# Patient Record
Sex: MALE | Race: WHITE
[De-identification: names, ages, dates, MRNs, and addresses within clinical notes are randomized per-mention and may not be internally consistent; named-entity substitution may affect disease eponyms.]

---

## 2020-08-04 ENCOUNTER — TRANSCRIPTION ENCOUNTER (OUTPATIENT)
Age: 45
End: 2020-08-04

## 2020-10-05 ENCOUNTER — EMERGENCY (EMERGENCY)
Facility: HOSPITAL | Age: 45
LOS: 0 days | Discharge: HOME | End: 2020-10-05
Attending: EMERGENCY MEDICINE | Admitting: EMERGENCY MEDICINE
Payer: COMMERCIAL

## 2020-10-05 VITALS
HEART RATE: 77 BPM | OXYGEN SATURATION: 99 % | TEMPERATURE: 98 F | SYSTOLIC BLOOD PRESSURE: 141 MMHG | DIASTOLIC BLOOD PRESSURE: 90 MMHG | RESPIRATION RATE: 18 BRPM

## 2020-10-05 VITALS
RESPIRATION RATE: 18 BRPM | TEMPERATURE: 98 F | HEIGHT: 68 IN | DIASTOLIC BLOOD PRESSURE: 107 MMHG | HEART RATE: 89 BPM | OXYGEN SATURATION: 99 % | SYSTOLIC BLOOD PRESSURE: 167 MMHG | WEIGHT: 184.97 LBS

## 2020-10-05 DIAGNOSIS — S20.212A CONTUSION OF LEFT FRONT WALL OF THORAX, INITIAL ENCOUNTER: ICD-10-CM

## 2020-10-05 DIAGNOSIS — M54.9 DORSALGIA, UNSPECIFIED: ICD-10-CM
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
## 2020-10-05 DIAGNOSIS — F17.200 NICOTINE DEPENDENCE, UNSPECIFIED, UNCOMPLICATED: ICD-10-CM

## 2020-10-05 DIAGNOSIS — I10 ESSENTIAL (PRIMARY) HYPERTENSION: ICD-10-CM

## 2020-10-05 DIAGNOSIS — R07.81 PLEURODYNIA: ICD-10-CM
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
## 2020-10-05 PROCEDURE — 99284 EMERGENCY DEPT VISIT MOD MDM: CPT

## 2020-10-05 PROCEDURE — 71046 X-RAY EXAM CHEST 2 VIEWS: CPT | Mod: 26

## 2020-10-05 RX ORDER — IBUPROFEN 200 MG
600 TABLET ORAL ONCE
Refills: 0 | Status: COMPLETED | OUTPATIENT
Start: 2020-10-05 | End: 2020-10-05

## 2020-10-05 RX ORDER — METHOCARBAMOL 500 MG/1
1500 TABLET, FILM COATED ORAL ONCE
Refills: 0 | Status: COMPLETED | OUTPATIENT
Start: 2020-10-05 | End: 2020-10-05

## 2020-10-05 RX ORDER — METHOCARBAMOL 500 MG/1
1 TABLET, FILM COATED ORAL
Qty: 9 | Refills: 0
Start: 2020-10-05 | End: 2020-10-07

## 2020-10-05 RX ADMIN — Medication 600 MILLIGRAM(S): at 20:02

## 2020-10-05 RX ADMIN — METHOCARBAMOL 1500 MILLIGRAM(S): 500 TABLET, FILM COATED ORAL at 20:02

## 2020-10-05 NOTE — ED PROVIDER NOTE - NSFOLLOWUPINSTRUCTIONS_ED_ALL_ED_FT
Your xray shows no signs of broken bones or injury to your lung. Your pain is related to bruising of the rib. Please take ibuprofen 600mg every 8 hours as needed for pain and follow the instructions below.     Rib Contusion    A rib contusion is a deep bruise on your rib area. Contusions are the result of a blunt trauma that causes bleeding and injury to the tissues under the skin. A rib contusion may involve bruising of the ribs and of the skin and muscles in the area. The skin over the contusion may turn blue, purple, or yellow. Minor injuries will give you a painless contusion. More severe contusions may stay painful and swollen for a few weeks.    What are the causes?  This condition is usually caused by a blow, trauma, or direct force to an area of the body. This often occurs while playing contact sports.    What are the signs or symptoms?  Symptoms of this condition include:  Swelling and redness of the injured area.  Discoloration of the injured area.  Tenderness and soreness of the injured area.  Pain with or without movement.  How is this diagnosed?  This condition may be diagnosed based on:  Your symptoms and medical history.  A physical exam.  Imaging tests—such as an X-ray, CT scan, or MRI—to determine if there were internal injuries or broken bones (fractures).  How is this treated?  This condition may be treated with:  Rest. This is often the best treatment for a rib contusion.  Icing. This reduces swelling and inflammation.  Deep-breathing exercises. These may be recommended to reduce the risk for lung collapse and pneumonia.  Medicines. Over-the-counter or prescription medicines may be given to control pain.  Injection of a numbing medicine around the nerve near your injury (nerve block).  Follow these instructions at home:    Medicines     Take over-the-counter and prescription medicines only as told by your health care provider.  Do not drive or use heavy machinery while taking prescription pain medicine.  If you are taking prescription pain medicine, take actions to prevent or treat constipation. Your health care provider may recommend that you:   Drink enough fluid to keep your urine pale yellow.   Eat foods that are high in fiber, such as fresh fruits and vegetables, whole grains, and beans.   Limit foods that are high in fat and processed sugars, such as fried or sweet foods.   Take an over-the-counter or prescription medicine for constipation.  Managing pain, stiffness, and swelling     If directed, put ice on the injured area:  Put ice in a plastic bag.  Place a towel between your skin and the bag.  Leave the ice on for 20 minutes, 2–3 times a day.  Rest the injured area. Avoid strenuous activity and any activities or movements that cause pain. Be careful during activities and avoid bumping the injured area.  Do not lift anything that is heavier than 5 lb (2.3 kg), or the limit that you are told, until your health care provider says that it is safe.  General instructions     Do not use any products that contain nicotine or tobacco, such as cigarettes and e-cigarettes. These can delay healing. If you need help quitting, ask your health care provider.  Do deep-breathing exercises as told by your health care provider.  If you were given an incentive spirometer, use it every 1–2 hours while you are awake, or as recommended by your health care provider. This device measures how well you are filling your lungs with each breath.  Keep all follow-up visits as told by your health care provider. This is important.  Contact a health care provider if you have:  Increased bruising or swelling.  Pain that is not controlled with treatment.  A fever.  Get help right away if you:  Have difficulty breathing or shortness of breath.  Develop a continual cough or you cough up thick or bloody sputum.  Feel nauseous or you vomit.  Have pain in your abdomen.      Summary  A rib contusion is a deep bruise on your rib area. Contusions are the result of a blunt trauma that causes bleeding and injury to the tissues under the skin.  The skin overlying the contusion may turn blue, purple, or yellow. Minor injuries may give you a painless contusion. More severe contusions may stay painful and swollen for a few weeks.  Rest the injured area. Avoid strenuous activity and any activities or movements that cause pain.  This information is not intended to replace advice given to you by your health care provider. Make sure you discuss any questions you have with your health care provider.

## 2020-10-05 NOTE — ED PROVIDER NOTE - NS ED ROS FT
Constitutional: (-) fever  Eyes/ENT: (-) blurry vision, (-) epistaxis  Cardiovascular: (-) chest pain, (-) syncope  Respiratory: (-) cough, (-) shortness of breath  Gastrointestinal: (-) vomiting, (-) diarrhea  Musculoskeletal: (-) neck pain, (+) L back/flank pain, (-) joint pain  Integumentary: (-) rash, (-) edema  Neurological: (-) headache, (-) altered mental status  Psychiatric: (-) hallucinations  Allergic/Immunologic: (-) pruritus
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
## 2020-10-05 NOTE — ED PROVIDER NOTE - PATIENT PORTAL LINK FT
You can access the FollowMyHealth Patient Portal offered by [facility] by registering at the following website: [email or web address]. By joining [employer]’s FollowMyHealth portal, you will also be able to view your health information using other applications (apps) compatible with our system.
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
## 2020-10-05 NOTE — ED PROVIDER NOTE - PHYSICAL EXAMINATION
Vital Signs: I have reviewed the initial vital signs.  Constitutional: well-nourished, appears stated age, no acute distress  Cardiovascular: regular rate, regular rhythm, well-perfused extremities  Respiratory: unlabored respiratory effort, clear to auscultation bilaterally  Gastrointestinal: soft, non-tender abdomen  Musculoskeletal: supple neck, no lower extremity edema. No cervical, thoracic, lumbar midline or paraspinal tenderness. Clavicles intact. No chest wall tenderness. Pelvis stable, non-tender. No ecchymosis or swelling present over L flank.   Integumentary: warm, dry, no rash  Neurologic: awake, alert, extremities’ motor and sensory functions grossly intact  Psychiatric: appropriate mood, appropriate affect

## 2021-04-04 ENCOUNTER — TRANSCRIPTION ENCOUNTER (OUTPATIENT)
Age: 46
End: 2021-04-04

## 2021-05-16 ENCOUNTER — EMERGENCY (EMERGENCY)
Facility: HOSPITAL | Age: 46
LOS: 0 days | Discharge: HOME | End: 2021-05-16
Attending: EMERGENCY MEDICINE | Admitting: EMERGENCY MEDICINE
Payer: COMMERCIAL

## 2021-05-16 VITALS
HEART RATE: 92 BPM | OXYGEN SATURATION: 100 % | DIASTOLIC BLOOD PRESSURE: 96 MMHG | RESPIRATION RATE: 18 BRPM | SYSTOLIC BLOOD PRESSURE: 162 MMHG

## 2021-05-16 VITALS
RESPIRATION RATE: 18 BRPM | WEIGHT: 182.1 LBS | TEMPERATURE: 99 F | HEART RATE: 98 BPM | SYSTOLIC BLOOD PRESSURE: 179 MMHG | DIASTOLIC BLOOD PRESSURE: 111 MMHG | OXYGEN SATURATION: 100 % | HEIGHT: 68 IN

## 2021-05-16 DIAGNOSIS — S80.212A ABRASION, LEFT KNEE, INITIAL ENCOUNTER: ICD-10-CM

## 2021-05-16 DIAGNOSIS — E78.00 PURE HYPERCHOLESTEROLEMIA, UNSPECIFIED: ICD-10-CM

## 2021-05-16 DIAGNOSIS — F17.200 NICOTINE DEPENDENCE, UNSPECIFIED, UNCOMPLICATED: ICD-10-CM
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
## 2021-05-16 DIAGNOSIS — S00.93XA CONTUSION OF UNSPECIFIED PART OF HEAD, INITIAL ENCOUNTER: ICD-10-CM

## 2021-05-16 DIAGNOSIS — Z88.0 ALLERGY STATUS TO PENICILLIN: ICD-10-CM

## 2021-05-16 DIAGNOSIS — L53.9 ERYTHEMATOUS CONDITION, UNSPECIFIED: ICD-10-CM
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
## 2021-05-16 DIAGNOSIS — S09.90XA UNSPECIFIED INJURY OF HEAD, INITIAL ENCOUNTER: ICD-10-CM

## 2021-05-16 DIAGNOSIS — I10 ESSENTIAL (PRIMARY) HYPERTENSION: ICD-10-CM

## 2021-05-16 PROCEDURE — 70450 CT HEAD/BRAIN W/O DYE: CPT | Mod: 26,MA

## 2021-05-16 PROCEDURE — 71101 X-RAY EXAM UNILAT RIBS/CHEST: CPT | Mod: 26,LT

## 2021-05-16 PROCEDURE — 99284 EMERGENCY DEPT VISIT MOD MDM: CPT

## 2021-05-16 RX ORDER — TETANUS TOXOID, REDUCED DIPHTHERIA TOXOID AND ACELLULAR PERTUSSIS VACCINE, ADSORBED 5; 2.5; 8; 8; 2.5 [IU]/.5ML; [IU]/.5ML; UG/.5ML; UG/.5ML; UG/.5ML
0.5 SUSPENSION INTRAMUSCULAR ONCE
Refills: 0 | Status: COMPLETED | OUTPATIENT
Start: 2021-05-16 | End: 2021-05-16

## 2021-05-16 RX ADMIN — TETANUS TOXOID, REDUCED DIPHTHERIA TOXOID AND ACELLULAR PERTUSSIS VACCINE, ADSORBED 0.5 MILLILITER(S): 5; 2.5; 8; 8; 2.5 SUSPENSION INTRAMUSCULAR at 15:45
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
## 2021-05-16 NOTE — ED PROVIDER NOTE - PHYSICAL EXAMINATION
Gen: Alert, NAD, well appearing  Head: NC, +contusion and soft tissue swelling to left side of head. PERRL, EOMI, normal lids/conjunctiva  ENT: normal hearing  Neck: +supple, no tenderness/meningismus,  Pulm: Bilateral BS, normal resp effort, no wheeze/stridor/retractions  CV: RRR  Abd: soft, NT/ND  Mskel: no edema/erythema/cyanosis. FROM x4  Skin: no rash, warm/dry. +redness to left upper back, left side of abdomen. + abrasions to left knee  Neuro: AAOx3, no sensory/motor deficits. Normal gait

## 2021-05-16 NOTE — ED PROVIDER NOTE - PATIENT PORTAL LINK FT
You can access the FollowMyHealth Patient Portal offered by [facility] by registering at the following website: [email or web address]. By joining [employer]’s FollowMyHealth portal, you will also be able to view your health information using other applications (apps) compatible with our system.
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
## 2021-05-16 NOTE — ED ADULT NURSE NOTE - NSIMPLEMENTINTERV_GEN_ALL_ED
Implemented All Universal Safety Interventions:  [place] to call system. Call bell, personal items and telephone within reach. Instruct patient to call for assistance. Room bathroom lighting operational. Non-slip footwear when patient is off stretcher. Physically safe environment: no spills, clutter or unnecessary equipment. Stretcher in lowest position, wheels locked, appropriate side rails in place.

## 2021-05-16 NOTE — ED PROVIDER NOTE - NSFOLLOWUPCLINICS_GEN_ALL_ED_FT
[facility] Concussion Program  Concussion Program  [address] [place] [address]   Phone: [phone]  Fax:   Follow Up Time: 1-3 Days

## 2021-05-16 NOTE — ED PROVIDER NOTE - CARE PLAN
Principal Discharge DX:	Head injury  Secondary Diagnosis:	Contusion  Secondary Diagnosis:	Abrasion

## 2021-05-16 NOTE — ED PROVIDER NOTE - NSFOLLOWUPINSTRUCTIONS_ED_ALL_ED_FT
Closed Head Injury    Closed head injury in an injury to your head that may or may not involve a traumatic brain injury (TBI). Symptoms of TBI can be short or long lasting and include headache, dizziness, interference with memory or speech, fatigue, confusion, changes in sleep, mood changes, nausea, depression/anxiety, and dulling of senses. Make sure to obtain proper rest which includes getting plenty of sleep, avoiding excessive visual stimulation, and avoiding activities that may cause physical or mental stress. Avoid any situation where there is potential for another head injury including sports.    SEEK MEDICAL CARE IF YOU HAVE THE FOLLOWING SYMPTOMS: unusual drowsiness, vomiting, severe dizziness, seizures, lightheadedness, muscular weakness, different pupil sizes, visual changes, or clear or bloody discharge from your ears or nose.    Contusion    A contusion is a deep bruise. Contusions are the result of a blunt injury to tissues and muscle fibers under the skin. The skin overlying the contusion may turn blue, purple, or yellow. Symptoms also include pain and swelling in the injured area. Symptoms should resolve with time.     SEEK IMMEDIATE MEDICAL CARE IF YOU HAVE THE FOLLOWING SYMPTOMS: severe pain, numbness, tingling, pain, weakness, or skin color/temperature change in any part of your body distal to the fracture.    Abrasion    An abrasion is a cut or scrape on the outer surface of your skin. An abrasion does not extend through all of the layers of your skin. It is important to care for your abrasion properly to prevent infection.     CAUSES  Most abrasions are caused by falling on or gliding across the ground or another surface. When your skin rubs on something, the outer and inner layer of skin rubs off.     SYMPTOMS  A cut or scrape is the main symptom of this condition. The scrape may be bleeding, or it may appear red or pink. If there was an associated fall, there may be an underlying bruise.    DIAGNOSIS  An abrasion is diagnosed with a physical exam.    TREATMENT  Treatment for this condition depends on how large and deep the abrasion is. Usually, your abrasion will be cleaned with water and mild soap. This removes any dirt or debris that may be stuck. An antibiotic ointment may be applied to the abrasion to help prevent infection. A bandage (dressing) may be placed on the abrasion to keep it clean.    You may also need a tetanus shot.    HOME CARE INSTRUCTIONS  Medicines    Take or apply medicines only as directed by your health care provider.  If you were prescribed an antibiotic ointment, finish all of it even if you start to feel better.    Wound Care    Clean the wound with mild soap and water 2–3 times per day or as directed by your health care provider. Pat your wound dry with a clean towel. Do not rub it.  There are many different ways to close and cover a wound. Follow instructions from your health care provider about:  Wound care.  Dressing changes and removal.  Check your wound every day for signs of infection. Watch for:  Redness, swelling, or pain.  Fluid, blood, or pus.    General Instructions    Keep the dressing dry as directed by your health care provider. Do not take baths, swim, use a hot tub, or do anything that would put your wound underwater until your health care provider approves.   If there is swelling, raise (elevate) the injured area above the level of your heart while you are sitting or lying down.  Keep all follow-up visits as directed by your health care provider. This is important.    SEEK MEDICAL CARE IF:  You received a tetanus shot and you have swelling, severe pain, redness, or bleeding at the injection site.  Your pain is not controlled with medicine.  You have increased redness, swelling, or pain at the site of your wound.    SEEK IMMEDIATE MEDICAL CARE IF:  You have a red streak going away from your wound.  You have a fever.  You have fluid, blood, or pus coming from your wound.  You notice a bad smell coming from your wound or your dressing.    ADDITIONAL NOTES AND INSTRUCTIONS    Please follow up with your Primary MD in 24-48 hr.  Seek immediate medical care for any new/worsening signs or symptoms.

## 2021-05-16 NOTE — ED PROVIDER NOTE - CLINICAL SUMMARY MEDICAL DECISION MAKING FREE TEXT BOX
Results reviewed and d/w patient, Rec ice, rest, Tylenol as needed for pain.  Pt to follow upw ith PMD/Neuro.  Strict return precautions discussed. Pt instructed to return if any worsening symptoms or concerns.  They verbalize understanding.

## 2021-05-16 NOTE — ED PROVIDER NOTE - NS ED ROS FT
Review of Systems    Constitutional: (-) fever, (-) chills  Eyes/ENT: (-) blurry vision, (-) epistaxis, (-) sore throat  Cardiovascular: (-) chest pain, (-) syncope  Respiratory: (-) cough, (-) shortness of breath  Gastrointestinal: (-) pain, (-) nausea, (-) vomiting, (-) diarrhea  Musculoskeletal: (-) neck pain, (-) back pain, (+) left sided body pain  Integumentary: (-) rash, (-) edema, (+) abrasions  Neurological: (-) headache, (-) altered mental status, (+) loc

## 2021-06-27 ENCOUNTER — EMERGENCY (EMERGENCY)
Facility: HOSPITAL | Age: 46
LOS: 0 days | Discharge: HOME | End: 2021-06-27
Attending: EMERGENCY MEDICINE | Admitting: EMERGENCY MEDICINE
Payer: COMMERCIAL

## 2021-06-27 VITALS
DIASTOLIC BLOOD PRESSURE: 94 MMHG | RESPIRATION RATE: 18 BRPM | HEART RATE: 115 BPM | HEIGHT: 68 IN | OXYGEN SATURATION: 98 % | SYSTOLIC BLOOD PRESSURE: 178 MMHG | WEIGHT: 182.1 LBS | TEMPERATURE: 99 F

## 2021-06-27 VITALS
OXYGEN SATURATION: 98 % | RESPIRATION RATE: 16 BRPM | DIASTOLIC BLOOD PRESSURE: 98 MMHG | HEART RATE: 84 BPM | SYSTOLIC BLOOD PRESSURE: 170 MMHG

## 2021-06-27 DIAGNOSIS — M25.511 PAIN IN RIGHT SHOULDER: ICD-10-CM
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
## 2021-06-27 DIAGNOSIS — E78.5 HYPERLIPIDEMIA, UNSPECIFIED: ICD-10-CM

## 2021-06-27 DIAGNOSIS — Z87.81 PERSONAL HISTORY OF (HEALED) TRAUMATIC FRACTURE: ICD-10-CM

## 2021-06-27 DIAGNOSIS — M79.644 PAIN IN RIGHT FINGER(S): ICD-10-CM

## 2021-06-27 DIAGNOSIS — E78.00 PURE HYPERCHOLESTEROLEMIA, UNSPECIFIED: ICD-10-CM

## 2021-06-27 DIAGNOSIS — Z79.899 OTHER LONG TERM (CURRENT) DRUG THERAPY: ICD-10-CM

## 2021-06-27 DIAGNOSIS — R07.81 PLEURODYNIA: ICD-10-CM

## 2021-06-27 DIAGNOSIS — F17.200 NICOTINE DEPENDENCE, UNSPECIFIED, UNCOMPLICATED: ICD-10-CM
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
## 2021-06-27 DIAGNOSIS — M79.645 PAIN IN LEFT FINGER(S): ICD-10-CM

## 2021-06-27 DIAGNOSIS — Z86.59 PERSONAL HISTORY OF OTHER MENTAL AND BEHAVIORAL DISORDERS: ICD-10-CM

## 2021-06-27 DIAGNOSIS — M25.512 PAIN IN LEFT SHOULDER: ICD-10-CM

## 2021-06-27 DIAGNOSIS — Z98.890 OTHER SPECIFIED POSTPROCEDURAL STATES: Chronic | ICD-10-CM

## 2021-06-27 DIAGNOSIS — Z88.0 ALLERGY STATUS TO PENICILLIN: ICD-10-CM

## 2021-06-27 DIAGNOSIS — I10 ESSENTIAL (PRIMARY) HYPERTENSION: ICD-10-CM

## 2021-06-27 PROBLEM — F12.90 CANNABIS USE, UNSPECIFIED, UNCOMPLICATED: Chronic | Status: ACTIVE | Noted: 2021-05-16

## 2021-06-27 PROCEDURE — 71101 X-RAY EXAM UNILAT RIBS/CHEST: CPT | Mod: 26,RT

## 2021-06-27 PROCEDURE — 73130 X-RAY EXAM OF HAND: CPT | Mod: 26,LT

## 2021-06-27 PROCEDURE — 99283 EMERGENCY DEPT VISIT LOW MDM: CPT | Mod: 25

## 2021-06-27 PROCEDURE — 29125 APPL SHORT ARM SPLINT STATIC: CPT | Mod: LT

## 2021-06-27 PROCEDURE — 73030 X-RAY EXAM OF SHOULDER: CPT | Mod: 26,RT

## 2021-06-27 RX ORDER — OMEGA-3 ACID ETHYL ESTERS 1 G
0 CAPSULE ORAL
Qty: 0 | Refills: 0 | DISCHARGE

## 2021-06-27 RX ORDER — ACETAMINOPHEN 500 MG
650 TABLET ORAL ONCE
Refills: 0 | Status: COMPLETED | OUTPATIENT
Start: 2021-06-27 | End: 2021-06-27

## 2021-06-27 RX ORDER — LOSARTAN POTASSIUM 100 MG/1
1 TABLET, FILM COATED ORAL
Qty: 0 | Refills: 0 | DISCHARGE

## 2021-06-27 RX ORDER — AMLODIPINE BESYLATE 2.5 MG/1
1 TABLET ORAL
Qty: 0 | Refills: 0 | DISCHARGE

## 2021-06-27 RX ADMIN — Medication 650 MILLIGRAM(S): at 14:09

## 2021-06-27 NOTE — ED PROVIDER NOTE - CARE PROVIDER_API CALL
Mathew Heredia)  Orthopaedic Surgery  [address] [place] [address]  Phone: [phone]  Fax: [phone]  Follow Up Time:

## 2021-06-27 NOTE — ED PROVIDER NOTE - NSFOLLOWUPINSTRUCTIONS_ED_ALL_ED_FT
Shoulder Pain    Many things can cause shoulder pain, including:    An injury to the area.  Overuse of the shoulder.  Arthritis.    The source of the pain can be:    Inflammation.  An injury to the shoulder joint.  An injury to a tendon, ligament, or bone.    HOME CARE INSTRUCTIONS  Take these actions to help with your pain:     Squeeze a soft ball or a foam pad as much as possible. This helps to keep the shoulder from swelling. It also helps to strengthen the arm.  Take over-the-counter and prescription medicines only as told by your health care provider.  If directed, apply ice to the area:  Put ice in a plastic bag.  Place a towel between your skin and the bag.  Leave the ice on for 20 minutes, 2–3 times per day. Stop applying ice if it does not help with the pain.  If you were given a shoulder sling or immobilizer:  Wear it as told.  Remove it to shower or bathe.  Move your arm as little as possible, but keep your hand moving to prevent swelling.    SEEK MEDICAL CARE IF:  Your pain gets worse.  Your pain is not relieved with medicines.  New pain develops in your arm, hand, or fingers.    SEEK IMMEDIATE MEDICAL CARE IF:  Your arm, hand, or fingers:  Tingle.  Become numb.  Become swollen.  Become painful.  Turn white or blue.    ADDITIONAL NOTES AND INSTRUCTIONS    Please follow up with your Primary MD in 24-48 hr.  Seek immediate medical care for any new/worsening signs or symptoms.     Thumb Sprain  A thumb sprain is an injury to one of the strong bands of tissue (ligaments) that connect the bones in your thumb. The ligament can be stretched too much or it can tear. A tear can be either partial or complete. The severity of the sprain depends on how much of the ligament was damaged or torn.    What are the causes?  A thumb sprain is often caused by a fall or an accident. If you extend your hands to catch an object or to protect yourself, the force of the impact can cause your ligament to stretch too much. This excess tension can also cause your ligament to tear.    What increases the risk?  This injury is more likely to occur in people who play:    Sports that involve a greater risk of falling, such as skiing.  Sports that involve catching an object, such as basketball.    What are the signs or symptoms?  Symptoms of this condition include:    Loss of motion in your thumb.  Bruising.  Tenderness.  Swelling.    How is this diagnosed?  This condition is diagnosed with a medical history and physical exam. You may also have an X-ray of your thumb.    How is this treated?  Treatment varies depending on the severity of your sprain. If your ligament is overstretched or partially torn, treatment usually involves keeping your thumb in a fixed position (immobilization) for a period of time. To help you do this, your health care provider will apply a bandage, cast, or splint to keep your thumb from moving until it heals.    If your ligament is fully torn, you may need surgery to reconnect the ligament to the bone. After surgery, a cast or splint will be applied and will need to stay on your thumb while it heals.    Your health care provider may also suggest exercises or physical therapy to strengthen your thumb.    Follow these instructions at home:  If you have a cast:     Do not stick anything inside the cast to scratch your skin. Doing that increases your risk of infection.  Check the skin around the cast every day. Report any concerns to your health care provider. You may put lotion on dry skin around the edges of the cast. Do not apply lotion to the skin underneath the cast.  Keep the cast clean and dry.  If you have a splint:     Wear it as directed by your health care provider. Remove it only as directed by your health care provider.  Loosen the splint if your fingers become numb and tingle, or if they turn cold and blue.  Keep the splint clean and dry.  Bathing     Cover the bandage, cast, or splint with a watertight plastic bag to protect it from water while you take a bath or a shower. Do not let the bandage, cast, or splint get wet.  Managing pain, stiffness, and swelling     If directed, apply ice to the injured area (unless you have a cast):    Put ice in a plastic bag.  Place a towel between your skin and the bag.  Leave the ice on for 20 minutes, 2–3 times per day.    Move your fingers often to avoid stiffness and to lessen swelling.  Raise (elevate) the injured area above the level of your heart while you are sitting or lying down.  Driving     Do not drive or operate heavy machinery while taking pain medicine.  Do not drive while wearing a cast or splint on a hand that you use for driving.  General instructions     Do not put pressure on any part of your cast or splint until it is fully hardened. This may take several hours.  Take medicines only as directed by your health care provider. These include over-the-counter medicines and prescription medicines.  Keep all follow-up visits as directed by your health care provider. This is important.  Do any exercise or physical therapy as directed by your health care provider.  Do not wear rings on your injured thumb.  Contact a health care provider if:  Your pain is not controlled with medicine.  Your bruising or swelling gets worse.  Your cast or splint is damaged.  Get help right away if:  Your thumb is numb or blue.  Your thumb feels colder than normal.  This information is not intended to replace advice given to you by your health care provider. Make sure you discuss any questions you have with your health care provider.
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
## 2021-06-27 NOTE — ED PROVIDER NOTE - PHYSICAL EXAMINATION
CONSTITUTIONAL: Well-appearing; well-nourished; in no apparent distress.   EYES: PERRL; EOM intact.   ENT: normal nose; no rhinorrhea; normal pharynx with no tonsillar hypertrophy.   NECK: Supple; non-tender; no cervical lymphadenopathy.   CARDIOVASCULAR: Normal S1, S2; no murmurs, rubs, or gallops.   RESPIRATORY: Normal chest excursion with respiration; breath sounds clear and equal bilaterally; no wheezes, rhonchi, or rales.  GI/: Normal bowel sounds; non-distended; non-tender; no palpable organomegaly.   MS: No evidence of trauma or deformity. Normal ROM in all four extremities; + mild ttp to rt ac. full rom of rt shoulder. + ttp to rt anterior lower ribs and L MTP. No snuffbox ttp. distal pulses are normal. no midline spinal ttp. Stable pelvis  SKIN: Normal for age and race; warm; dry; good turgor; no apparent lesions or exudate.   NEURO/PSYCH: A & O x 4; grossly unremarkable. mood and manner are appropriate. No focal deficits. strength equal b/l 5/5 throughout. Sensation intact. Normal gait. cerebellar intact.

## 2021-06-27 NOTE — ED PROVIDER NOTE - NS ED ROS FT
Constitutional: no fever, chills, no recent weight loss, change in appetite or malaise  Eyes: no redness/discharge/pain/vision changes  ENT: no rhinorrhea/ear pain/sore throat  Cardiac: No chest pain, SOB or edema.  Respiratory: No cough or respiratory distress  GI: No nausea, vomiting, diarrhea or abdominal pain.  : No dysuria, frequency, urgency or hematuria  MS: + rt shoulder/rt sided rib pain and L thumb pain. no loss of ROM, no weakness  Neuro: No headache or weakness. No LOC.  Skin: No skin rash, bruising, abrasions  Endocrine: No history of thyroid disease or diabetes.  Except as documented in the HPI, all other systems are negative.

## 2021-06-27 NOTE — ED PROCEDURE NOTE - CPROC ED POST PROC CARE GUIDE1
Verbal/written post procedure instructions were given to patient/caregiver./Instructed patient/caregiver to follow-up with primary care physician./Instructed patient/caregiver regarding signs and symptoms of infection./Elevate the injured extremity as instructed./Keep the cast/splint/dressing clean and dry.
Verbal/written post procedure instructions were given to patient/caregiver./Instructed patient/caregiver to follow-up with primary care physician./Instructed patient/caregiver regarding signs and symptoms of infection./Elevate the injured extremity as instructed./Keep the cast/splint/dressing clean and dry.
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
## 2021-06-27 NOTE — ED ADULT NURSE NOTE - CAS EDN DISCHARGE ASSESSMENT
PAST MEDICAL HISTORY:  No pertinent past medical history Alert and oriented to person, place and time

## 2021-06-27 NOTE — ED PROVIDER NOTE - PATIENT PORTAL LINK FT
You can access the FollowMyHealth Patient Portal offered by [facility] by registering at the following website: [email or web address]. By joining [employer]’s FollowMyHealth portal, you will also be able to view your health information using other applications (apps) compatible with our system.
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
## 2021-06-27 NOTE — ED PROCEDURE NOTE - ATTENDING CONTRIBUTION TO CARE
.tt I was present for and supervised the key and critical aspects of the procedures performed during the care of the patient.
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
## 2022-03-08 NOTE — ED ADULT TRIAGE NOTE - HEIGHT IN INCHES
From: Rusty Valiente  To: Flores Mercado  Sent: 3/3/2022 8:19 AM CST  Subject: Exercise    Hello Dr. Mercado, I was told by your staff yesterday not to lift more than 30 pounds. Am I allowed to do push ups? I was also wondering why I had a lifting restriction. Also, it doesn't look like the new prescriptions have been sent to [facility] yet.    Thanks,    Casey Valiente  
MD Rebeca Richard, RN  Caller: Unspecified (Today,  8:19 AM)  Weight limit because aorta is dilated   AVS instructions were sent   If Crestor not covered then patient can remain on Lipitor.         3/2/22    Increase Lisinopril to 40 mg daily.     Patient should not lift weights heavier than 30 pounds.     Discontinue Lipitor.   Start Crestor 20 mg daily and increase to 40 mg daily if tolerated. (Give prescription for Crestor 40 mg daily, dispense 20 mg tablets)        Pt will be messaged the info and orders placed Crestor is a plan exclusion. Lisinopril pt had been on in the past and now this is an increase  
Message sent to the pt on [facility] to clarify the orders and the location.  
Will route pt question about the 30 pound weight restriction and if he is allowed to do push ups. Also waiting to hear back from the message Meredith sent Dr Mercado about his medication from the AVS she sent yesterday  
8

## 2022-07-23 ENCOUNTER — EMERGENCY (EMERGENCY)
Facility: HOSPITAL | Age: 47
LOS: 0 days | Discharge: HOME | End: 2022-07-23
Attending: STUDENT IN AN ORGANIZED HEALTH CARE EDUCATION/TRAINING PROGRAM | Admitting: STUDENT IN AN ORGANIZED HEALTH CARE EDUCATION/TRAINING PROGRAM

## 2022-07-23 VITALS
WEIGHT: 164.91 LBS | DIASTOLIC BLOOD PRESSURE: 77 MMHG | RESPIRATION RATE: 17 BRPM | HEIGHT: 68 IN | SYSTOLIC BLOOD PRESSURE: 121 MMHG | HEART RATE: 89 BPM | TEMPERATURE: 97 F | OXYGEN SATURATION: 98 %

## 2022-07-23 DIAGNOSIS — S61.012A LACERATION WITHOUT FOREIGN BODY OF LEFT THUMB WITHOUT DAMAGE TO NAIL, INITIAL ENCOUNTER: ICD-10-CM
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
## 2022-07-23 DIAGNOSIS — I10 ESSENTIAL (PRIMARY) HYPERTENSION: ICD-10-CM

## 2022-07-23 DIAGNOSIS — E78.00 PURE HYPERCHOLESTEROLEMIA, UNSPECIFIED: ICD-10-CM

## 2022-07-23 DIAGNOSIS — Z88.0 ALLERGY STATUS TO PENICILLIN: ICD-10-CM

## 2022-07-23 DIAGNOSIS — F12.99 CANNABIS USE, UNSPECIFIED WITH UNSPECIFIED CANNABIS-INDUCED DISORDER: ICD-10-CM
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
## 2022-07-23 DIAGNOSIS — F17.200 NICOTINE DEPENDENCE, UNSPECIFIED, UNCOMPLICATED: ICD-10-CM

## 2022-07-23 DIAGNOSIS — Z98.890 OTHER SPECIFIED POSTPROCEDURAL STATES: Chronic | ICD-10-CM

## 2022-07-23 PROCEDURE — 99283 EMERGENCY DEPT VISIT LOW MDM: CPT | Mod: 25

## 2022-07-23 PROCEDURE — 12002 RPR S/N/AX/GEN/TRNK2.6-7.5CM: CPT

## 2022-07-23 PROCEDURE — 73120 X-RAY EXAM OF HAND: CPT | Mod: 26,LT

## 2022-07-23 NOTE — ED PROVIDER NOTE - NSICDXPASTSURGICALHX_GEN_ALL_CORE_FT
Dementia
PAST SURGICAL HISTORY:  History of tracheostomy     
Dementia

## 2022-07-23 NOTE — ED PROVIDER NOTE - NSFOLLOWUPINSTRUCTIONS_ED_ALL_ED_FT
Sutures removed in 2 weeks      Laceration    A laceration is a cut that goes through all of the layers of the skin and into the tissue that is right under the skin. Some lacerations heal on their own. Others need to be closed with skin adhesive strips, skin glue, stitches (sutures), or staples. Proper laceration care minimizes the risk of infection and helps the laceration to heal better.  If non-absorbable stitches or staples have been placed, they must be taken out within the time frame instructed by your healthcare provider.    SEEK IMMEDIATE MEDICAL CARE IF YOU HAVE ANY OF THE FOLLOWING SYMPTOMS: swelling around the wound, worsening pain, drainage from the wound, red streaking going away from your wound, inability to move finger or toe near the laceration, or discoloration of skin near the laceration.

## 2022-07-23 NOTE — ED PROVIDER NOTE - PATIENT PORTAL LINK FT
You can access the FollowMyHealth Patient Portal offered by [facility] by registering at the following website: [email or web address]. By joining [employer]’s FollowMyHealth portal, you will also be able to view your health information using other applications (apps) compatible with our system.

## 2022-11-30 PROBLEM — Z00.00 ENCOUNTER FOR PREVENTIVE HEALTH EXAMINATION: Status: ACTIVE | Noted: 2022-11-30

## 2022-12-05 ENCOUNTER — APPOINTMENT (OUTPATIENT)
Dept: SURGERY | Facility: CLINIC | Age: 47
End: 2022-12-05
Payer: COMMERCIAL

## 2022-12-05 VITALS
TEMPERATURE: 97.5 F | BODY MASS INDEX: 27.15 KG/M2 | WEIGHT: 173 LBS | DIASTOLIC BLOOD PRESSURE: 80 MMHG | OXYGEN SATURATION: 98 % | HEIGHT: 67 IN | SYSTOLIC BLOOD PRESSURE: 144 MMHG | HEART RATE: 82 BPM

## 2022-12-05 PROCEDURE — 99204 OFFICE O/P NEW MOD 45 MIN: CPT

## 2022-12-05 NOTE — PHYSICAL EXAM
[JVD] : no jugular venous distention  [de-identified] : normal [de-identified] : normal - less than 1 cm discrete , mobile , soft non tender LN [de-identified] : normal

## 2022-12-05 NOTE — ASSESSMENT
[FreeTextEntry1] : Mr. LATIA LOU is a 47 year  year old man. He presents to the office on 12/05/2022 , his primary is Unable to Collect PCP .\par \par Has a palpable mass in the back of his right neck . \par \par it has been there for a few weeks. \par He smokes\par he has lost weight , intentionally - and because of that his blood pressure medications reduced. 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 \par \par We explained in great detail the pathophysiology of the disease process. We adamaris diagrams and discussed the workup for diagnosis and management.\par The various options were explained to the patient. The Risk , benefit and alternatives were discussed. We discussed recovery and possible complications.\par The Post operative care was explained to the patient. He was counselled on diet , exercise and wound care.\par We discussed the pathology and surgery with him.\par \par We discussed the importance of close follow up. \par We informed that he needs to follow up in 3-6 months. \par We also informed that he can call us if anything changes or has any questions.\par \par

## 2022-12-05 NOTE — HISTORY OF PRESENT ILLNESS
[de-identified] : Mr. LATIA LOU is a 47 year  year old man. He presents to the office on 12/05/2022 , his primary is Unable to Collect PCP .\par \par Has a palpable mass in the back of his right neck . \par \par it has been there for a few weeks. \par He smokes\par he has lost weight , intentionally - and because of that his blood pressure medications reduced. \par he needs multiple root canal treatment \par

## 2023-06-07 ENCOUNTER — APPOINTMENT (OUTPATIENT)
Dept: SURGERY | Facility: CLINIC | Age: 48
End: 2023-06-07
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
## 2023-11-15 ENCOUNTER — APPOINTMENT (OUTPATIENT)
Dept: SURGERY | Facility: CLINIC | Age: 48
End: 2023-11-15
Payer: COMMERCIAL

## 2023-11-15 VITALS
BODY MASS INDEX: 25.9 KG/M2 | OXYGEN SATURATION: 96 % | TEMPERATURE: 96.9 F | DIASTOLIC BLOOD PRESSURE: 98 MMHG | SYSTOLIC BLOOD PRESSURE: 142 MMHG | HEIGHT: 67 IN | WEIGHT: 165 LBS | HEART RATE: 103 BPM

## 2023-11-15 PROCEDURE — 99213 OFFICE O/P EST LOW 20 MIN: CPT

## 2023-12-21 NOTE — HISTORY OF PRESENT ILLNESS
[de-identified] : Mr. LATIA LOU is a 48-year-old man. He presents to the office on 12/05/2022, his primary is Unable to Collect PCP .  Has a palpable mass in the back of his right neck.   11/15/23: Patient returns to the office for a follow up for lymphadenopathy.   it has been there for a few weeks.  He smokes he has lost weight , intentionally - and because of that his blood pressure medications reduced.  he needs multiple root canal treatment

## 2023-12-21 NOTE — PHYSICAL EXAM
[JVD] : no jugular venous distention  [de-identified] : normal [de-identified] : normal - less than 1 cm discrete , mobile , soft non tender LN [de-identified] : normal
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
## 2024-01-18 ENCOUNTER — NON-APPOINTMENT (OUTPATIENT)
Age: 49
End: 2024-01-18

## 2024-01-18 ENCOUNTER — APPOINTMENT (OUTPATIENT)
Dept: OTOLARYNGOLOGY | Facility: CLINIC | Age: 49
End: 2024-01-18
Payer: COMMERCIAL

## 2024-01-18 VITALS — BODY MASS INDEX: 25.84 KG/M2 | WEIGHT: 165 LBS

## 2024-01-18 DIAGNOSIS — J38.1 POLYP OF VOCAL CORD AND LARYNX: ICD-10-CM

## 2024-01-18 DIAGNOSIS — R22.1 LOCALIZED SWELLING, MASS AND LUMP, NECK: ICD-10-CM

## 2024-01-18 DIAGNOSIS — R59.1 GENERALIZED ENLARGED LYMPH NODES: ICD-10-CM

## 2024-01-18 DIAGNOSIS — R49.0 DYSPHONIA: ICD-10-CM

## 2024-01-18 PROCEDURE — 99203 OFFICE O/P NEW LOW 30 MIN: CPT | Mod: 25

## 2024-01-18 PROCEDURE — 31575 DIAGNOSTIC LARYNGOSCOPY: CPT

## 2024-01-18 NOTE — PHYSICAL EXAM
[de-identified] : 1.5cm right level 5 LN, nontender [Normal] : mucosa is normal [Midline] : trachea located in midline position

## 2024-01-18 NOTE — PROCEDURE
[Hoarseness] : hoarseness not clearly evaluated by indirect laryngoscopy [None] : none [Flexible Endoscope] : examined with the flexible endoscope [de-identified] : Flexible laryngoscopy performed. Nasal cavity, nasopharynx, oropharynx, hypopharynx, and larynx evaluated. No masses or lesions, bilateral true vocal folds symmetric and mobile.  Mild dena's edema

## 2024-01-18 NOTE — REASON FOR VISIT
[Initial Evaluation] : an initial evaluation for [FreeTextEntry2] : lump on back  of neck ,  throat constriction ,
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
## 2024-03-04 ENCOUNTER — OUTPATIENT (OUTPATIENT)
Dept: OUTPATIENT SERVICES | Facility: HOSPITAL | Age: 49
LOS: 1 days | End: 2024-03-04
Payer: COMMERCIAL

## 2024-03-04 DIAGNOSIS — R94.39 ABNORMAL RESULT OF OTHER CARDIOVASCULAR FUNCTION STUDY: ICD-10-CM

## 2024-03-04 DIAGNOSIS — Z00.8 ENCOUNTER FOR OTHER GENERAL EXAMINATION: ICD-10-CM

## 2024-03-04 DIAGNOSIS — Z98.890 OTHER SPECIFIED POSTPROCEDURAL STATES: Chronic | ICD-10-CM

## 2024-03-04 PROCEDURE — 75574 CT ANGIO HRT W/3D IMAGE: CPT | Mod: 26

## 2024-03-04 PROCEDURE — 75574 CT ANGIO HRT W/3D IMAGE: CPT

## 2024-03-05 DIAGNOSIS — R94.39 ABNORMAL RESULT OF OTHER CARDIOVASCULAR FUNCTION STUDY: ICD-10-CM

## 2024-03-10 ENCOUNTER — EMERGENCY (EMERGENCY)
Facility: HOSPITAL | Age: 49
LOS: 0 days | Discharge: HOME | End: 2024-03-10
Attending: STUDENT IN AN ORGANIZED HEALTH CARE EDUCATION/TRAINING PROGRAM
Payer: COMMERCIAL

## 2024-03-10 VITALS
WEIGHT: 175.05 LBS | DIASTOLIC BLOOD PRESSURE: 86 MMHG | OXYGEN SATURATION: 98 % | RESPIRATION RATE: 18 BRPM | TEMPERATURE: 98 F | HEIGHT: 68 IN | SYSTOLIC BLOOD PRESSURE: 172 MMHG | HEART RATE: 101 BPM
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
## 2024-03-10 DIAGNOSIS — Z88.0 ALLERGY STATUS TO PENICILLIN: ICD-10-CM

## 2024-03-10 DIAGNOSIS — Z98.890 OTHER SPECIFIED POSTPROCEDURAL STATES: Chronic | ICD-10-CM

## 2024-03-10 DIAGNOSIS — S99.922A UNSPECIFIED INJURY OF LEFT FOOT, INITIAL ENCOUNTER: ICD-10-CM
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
## 2024-03-10 DIAGNOSIS — M79.672 PAIN IN LEFT FOOT: ICD-10-CM
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
## 2024-03-10 PROCEDURE — 99283 EMERGENCY DEPT VISIT LOW MDM: CPT | Mod: 25

## 2024-03-10 PROCEDURE — 73630 X-RAY EXAM OF FOOT: CPT | Mod: 26,LT

## 2024-03-10 PROCEDURE — 99284 EMERGENCY DEPT VISIT MOD MDM: CPT

## 2024-03-10 PROCEDURE — 73630 X-RAY EXAM OF FOOT: CPT | Mod: LT

## 2024-03-10 RX ORDER — IBUPROFEN 200 MG
600 TABLET ORAL ONCE
Refills: 0 | Status: COMPLETED | OUTPATIENT
Start: 2024-03-10 | End: 2024-03-10

## 2024-03-10 RX ADMIN — Medication 600 MILLIGRAM(S): at 16:27

## 2024-03-10 NOTE — ED PROVIDER NOTE - OBJECTIVE STATEMENT
49 y/o male presents to the Ed with left foot pain s/p twisting injury while running after dog pta. patient without any swelling or deformity. c/o throbbing pain . no tingling distally. no ankle or knee pain

## 2024-03-10 NOTE — ED PROVIDER NOTE - PATIENT PORTAL LINK FT
You can access the FollowMyHealth Patient Portal offered by [facility] by registering at the following website: [email or web address]. By joining [employer]’s FollowMyHealth portal, you will also be able to view your health information using other applications (apps) compatible with our system.

## 2024-03-10 NOTE — ED PROVIDER NOTE - CLINICAL SUMMARY MEDICAL DECISION MAKING FREE TEXT BOX
49 yo male, no PMHx, presenting with left foot pain that started when he was chasing his dogs this morning and felt like he stepped on it wrong. Has been able to ambulate since. Denies numbness, trauma elsewhere, inability to ambulate. Minimal tenderness to left lateral mid foot. FROm of left ankle. DP pulse palpable 2+. No swelling or erythema. Imaging ordered and reviewed. Medication given and effects reassessed. Discussed return precautions and follow up outpatient. Patient comfortable with plan.

## 2024-03-10 NOTE — ED PROVIDER NOTE - PHYSICAL EXAMINATION
generalized: alert, no distress  eyes: clear conjunctiva  msk: left foot no bony deformity, good rom of extremity, good cap refill, pulses distally in tact, no crepitation , deformity  skin: no bruising, no swelling, no lacerations   neuro: alert, oriented, no motor or sensory deficits, gait steady

## 2024-05-13 ENCOUNTER — EMERGENCY (EMERGENCY)
Facility: HOSPITAL | Age: 49
LOS: 0 days | Discharge: HOME | End: 2024-05-13
Attending: EMERGENCY MEDICINE
Payer: COMMERCIAL

## 2024-05-13 VITALS
OXYGEN SATURATION: 95 % | WEIGHT: 164.91 LBS | SYSTOLIC BLOOD PRESSURE: 104 MMHG | RESPIRATION RATE: 18 BRPM | DIASTOLIC BLOOD PRESSURE: 60 MMHG | HEART RATE: 60 BPM

## 2024-05-13 DIAGNOSIS — Z98.890 OTHER SPECIFIED POSTPROCEDURAL STATES: Chronic | ICD-10-CM

## 2024-05-13 DIAGNOSIS — I10 ESSENTIAL (PRIMARY) HYPERTENSION: ICD-10-CM

## 2024-05-13 DIAGNOSIS — Z88.0 ALLERGY STATUS TO PENICILLIN: ICD-10-CM
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
## 2024-05-13 DIAGNOSIS — Z23 ENCOUNTER FOR IMMUNIZATION: ICD-10-CM

## 2024-05-13 DIAGNOSIS — S61.412A LACERATION WITHOUT FOREIGN BODY OF LEFT HAND, INITIAL ENCOUNTER: ICD-10-CM

## 2024-05-13 DIAGNOSIS — S51.011A LACERATION WITHOUT FOREIGN BODY OF RIGHT ELBOW, INITIAL ENCOUNTER: ICD-10-CM

## 2024-05-13 DIAGNOSIS — E78.5 HYPERLIPIDEMIA, UNSPECIFIED: ICD-10-CM
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
## 2024-05-13 PROCEDURE — 73130 X-RAY EXAM OF HAND: CPT | Mod: LT

## 2024-05-13 PROCEDURE — 73080 X-RAY EXAM OF ELBOW: CPT | Mod: RT

## 2024-05-13 PROCEDURE — 90715 TDAP VACCINE 7 YRS/> IM: CPT

## 2024-05-13 PROCEDURE — 73130 X-RAY EXAM OF HAND: CPT | Mod: 26,LT

## 2024-05-13 PROCEDURE — 90471 IMMUNIZATION ADMIN: CPT

## 2024-05-13 PROCEDURE — 73080 X-RAY EXAM OF ELBOW: CPT | Mod: 26,RT

## 2024-05-13 PROCEDURE — 99284 EMERGENCY DEPT VISIT MOD MDM: CPT | Mod: 25

## 2024-05-13 PROCEDURE — 12005 RPR S/N/A/GEN/TRK12.6-20.0CM: CPT

## 2024-05-13 RX ORDER — TETANUS TOXOID, REDUCED DIPHTHERIA TOXOID AND ACELLULAR PERTUSSIS VACCINE, ADSORBED 5; 2.5; 8; 8; 2.5 [IU]/.5ML; [IU]/.5ML; UG/.5ML; UG/.5ML; UG/.5ML
0.5 SUSPENSION INTRAMUSCULAR ONCE
Refills: 0 | Status: COMPLETED | OUTPATIENT
Start: 2024-05-13 | End: 2024-05-13

## 2024-05-13 RX ORDER — LIDOCAINE HYDROCHLORIDE AND EPINEPHRINE 10; 10 MG/ML; UG/ML
10 INJECTION, SOLUTION INFILTRATION; PERINEURAL ONCE
Refills: 0 | Status: COMPLETED | OUTPATIENT
Start: 2024-05-13 | End: 2024-05-13

## 2024-05-13 RX ORDER — IBUPROFEN 200 MG
600 TABLET ORAL ONCE
Refills: 0 | Status: COMPLETED | OUTPATIENT
Start: 2024-05-13 | End: 2024-05-13

## 2024-05-13 RX ADMIN — LIDOCAINE HYDROCHLORIDE AND EPINEPHRINE 10 MILLILITER(S): 10; 10 INJECTION, SOLUTION INFILTRATION; PERINEURAL at 22:04

## 2024-05-13 RX ADMIN — Medication 300 MILLIGRAM(S): at 22:02

## 2024-05-13 RX ADMIN — Medication 600 MILLIGRAM(S): at 22:02

## 2024-05-13 RX ADMIN — TETANUS TOXOID, REDUCED DIPHTHERIA TOXOID AND ACELLULAR PERTUSSIS VACCINE, ADSORBED 0.5 MILLILITER(S): 5; 2.5; 8; 8; 2.5 SUSPENSION INTRAMUSCULAR at 22:18

## 2024-05-13 RX ADMIN — Medication 100 MILLIGRAM(S): at 22:02

## 2024-05-13 NOTE — ED PROVIDER NOTE - PHYSICAL EXAMINATION
VITAL SIGNS: I have reviewed nursing notes and confirm.  CONSTITUTIONAL: In no acute distress.  SKIN: Skin exam is warm and dry. 6.5cm laceration to the right elbow with adjacent .5cm and 3cm laceration just distally. 1cm and 1.5cm lacerations to the dorsal aspect of the right hand.  HEAD: Normocephalic; atraumatic.  EYES: PERRL, EOM intact; conjunctiva and sclera clear.  EXT: Normal ROM of B/L UEs. Sensation intact. Strength 5/5 throughout. Radial pulse 5+. Cap refill <2sec.   NEURO: Alert, oriented. Grossly unremarkable. No focal deficits. VITAL SIGNS: I have reviewed nursing notes and confirm.  CONSTITUTIONAL: In no acute distress.  SKIN: Skin exam is warm and dry. 9cm laceration to the right elbow with adjacent .5cm and 3cm laceration just distally. 1cm and 1.5cm lacerations to the dorsal aspect of the right hand.  HEAD: Normocephalic; atraumatic.  EYES: PERRL, EOM intact; conjunctiva and sclera clear.  EXT: Normal ROM of B/L UEs. Sensation intact. Strength 5/5 throughout. Radial pulse 5+. Cap refill <2sec.   NEURO: Alert, oriented. Grossly unremarkable. No focal deficits.

## 2024-05-13 NOTE — ED ADULT NURSE NOTE - FINAL NURSING ELECTRONIC SIGNATURE
Patient here today to receive 1 year vaccine and influenza.  Patient was seen for appointment with Asuncion Weir today and was counseled on needed vaccines.  Vaccines were not available in her clinic so patient will  receive needed vaccines here today.  Patient tolerated injections and left clinic with no incident.   13-May-2024 23:52

## 2024-05-13 NOTE — ED PROVIDER NOTE - CLINICAL SUMMARY MEDICAL DECISION MAKING FREE TEXT BOX
48yM HTN DLD p/w dog bites from trying to break up a fight between his own dogs.  Pt denies any concern for rabies though says they are not fully vaccinated against rabies; he declines rabies PEP.  He reports he is up to date on tetanus.  Lacs repaired loosely and will start PO abx.  D/w pt need for supportive care, o/p f/u, return precautions.

## 2024-05-13 NOTE — ED PROVIDER NOTE - OBJECTIVE STATEMENT
Patient is a 48-year-old male PMH HTN, HLD who presents ED for multiple lacerations from dog bites that occurred just PTA.  Patient reports his dogs had began fighting and in efforts to stop them he got between them, resulting in his dog biting into his right arm and left hand.  As per patient, dog is vaccinated.  Last Tdap unknown.  Denies fever, chills, paresthesias, nausea, vomiting.

## 2024-05-13 NOTE — ED PROVIDER NOTE - PATIENT PORTAL LINK FT
You can access the FollowMyHealth Patient Portal offered by [facility] by registering at the following website: [email or web address]. By joining [employer]’s FollowMyHealth portal, you will also be able to view your health information using other applications (apps) compatible with our system.

## 2024-05-13 NOTE — ED ADULT TRIAGE NOTE - GLASGOW COMA SCALE: EYE OPENING, MLM
(E4) spontaneous delivered -continue breast feeding and formula every 2-3 hr  -use seat car, rear facing  -follow up w/ Doctor in [facility]  in 2 days  -give to your baby supplement vitamins as prescribe   -baby should sleep over his/her back  -no cigarets smoking near baby   - follow up Bright future handout instruction , provided at time of discharge. Follow up with your pediatrician in 24-48 hrs.   Continue breastfeeding every 2-3 hrs.   Use rear-facing car seat.   Take vitamins as prescribed above.   Baby should sleep on his/her back.   No cigarette smoking near the baby.   Follow instructions on Bright Futures Parent Handout provided during time of discharge.

## 2024-05-13 NOTE — ED PROVIDER NOTE - PROGRESS NOTE DETAILS
Patient counseled at length regarding predisposition of wounds to become infected given nature of animal bite, and precautions to take to avoid including gentle cleansing of the area, patting dry, keeping dressings clean and changed at least twice a day, monitoring the wound looking for signs such as purulent drainage, fluctuance, erythema, streaking of the upper extremity, worsening pain, foul smell, and prescription for doxycycline and clindamycin and lieu of amoxicillin allergy.  Counseled to make sure he finishes course of antibiotics.  Patient smokes marijuana daily, advised to stop to promote better wound healing. Advised to closely monitor wounds, at least twice a day with dressing changes twice a day.  Patient also given sling to immobilize elbow given laceration overlies joint, advised to reduce range of motion of elbow to promote wound healing and avoid sutures becoming loosened or rupturing.  Advised to promote range of motion of the shoulder.  Does not have to wear sling while sleeping or resting at home.  Advised to make sure wound is covered especially if returning to work.  Given work note for 1 week to promote wound healing and avoid possible infection.  Also advised to avoid sunlight given Doxy side effects and interactions.  Return precautions given.  Will follow-up with PMD.  Will return in 14 days for suture removal.

## 2024-05-13 NOTE — ED PROVIDER NOTE - NSFOLLOWUPINSTRUCTIONS_ED_ALL_ED_FT
RETURN TO THE ED IN 10 DAYS TO HAVE YOUR SUTURES REMOVED.    TAKE YOUR ANTIBIOTICS AS PRESCRIBED. FINISH OUT THE ENTIRE COURSE. *****YOU MUST AVOID SUNLIGHT EXPOSURE WITH THE ANTIBIOTIC DOXYCYCLINE AS IT INCREASES YOUR SKIN'S SENSITIVITY TO THE SUN.**********    FOLLOW UP WITH YOUR PMD IN 1-2 DAYS.    Animal Bite, Adult    Animal bites range from mild to serious. An animal bite can result in any of these injuries:  A scratch.  A deep, open cut.  A puncture of the skin.  A crush injury.  Tearing away of the skin or a body part.  A bone injury.  A small bite from a house pet is usually less serious than a bite from a stray or wild animal, such as a raccoon, galeas, skunk, or bat. That is because stray and wild animals have a higher risk of carrying a serious infection called rabies, which can be passed to humans through a bite.    What increases the risk?  You are more likely to be bitten by an animal if:  You are around unfamiliar pets.  You disturb an animal when it is eating, sleeping, or caring for its babies.  You are outdoors in a place where small, wild animals roam freely.  What are the signs or symptoms?  Common symptoms of an animal bite include:  Pain.  Bleeding.  Swelling.  Bruising.  How is this diagnosed?  This condition may be diagnosed based on a physical exam and medical history. Your health care provider will examine your wound and ask for details about the animal and how the bite happened. You may also have tests, such as:  Blood tests to check for infection.  X-rays to check for damage to bones or joints.  Taking a fluid sample from your wound and checking it for infection (culture test).  How is this treated?  Treatment varies depending on the type of animal, where the bite is on your body, and your medical history. Treatment may include:  Caring for the wound. This often includes cleaning the wound, rinsing out (flushing) the wound with saline solution, and applying a bandage (dressing). In some cases, the wound may be closed with stitches (sutures), staples, skin glue, or adhesive strips.  Antibiotic medicine to prevent or treat infection. This medicine may be prescribed in pill or ointment form. If the bite area becomes infected, the medicine may be given through an IV.  A tetanus shot to prevent tetanus infection.  Rabies treatment to prevent rabies infection. This will be done if the animal could have rabies.  Surgery. This may be done if a bite gets infected or if there is damage that needs to be repaired.  Follow these instructions at home:  Wound care      Follow instructions from your health care provider about how to take care of your wound. Make sure you:  Wash your hands with soap and water before you change your dressing. If soap and water are not available, use hand .  Change your dressing as told by your health care provider.  Leave sutures, skin glue, or adhesive strips in place. These skin closures may need to stay in place for 2 weeks or longer. If adhesive strip edges start to loosen and curl up, you may trim the loose edges. Do not remove adhesive strips completely unless your health care provider tells you to do that.  Check your wound every day for signs of infection. Check for:  More redness, swelling, or pain.  More fluid or blood.  Warmth.  Pus or a bad smell.  Medicines     Take or apply over-the-counter and prescription medicines only as told by your health care provider.  If you were prescribed an antibiotic, take or apply it as told by your health care provider. Do not stop using the antibiotic even if your condition improves.  General instructions     Image   Keep the injured area raised (elevated) above the level of your heart while you are sitting or lying down, if this is possible.  If directed, put ice on the injured area.  Put ice in a plastic bag.  Place a towel between your skin and the bag.  Leave the ice on for 20 minutes, 2–3 times per day.  Keep all follow-up visits as told by your health care provider. This is important.  Contact a health care provider if:  You have more redness, swelling, or pain around your wound.  Your wound feels warm to the touch.  You have a fever or chills.  You have a general feeling of sickness (malaise).  You feel nauseous or you vomit.  You have pain that does not get better.  Get help right away if:  You have a red streak that leads away from your wound.  You have non-clear fluid or more blood coming from your wound.  There is pus or a bad smell coming from your wound.  You have trouble moving your injured area.  You have numbness or tingling that extends beyond the wound.  Summary  Animal bites can range from mild to serious. An animal bite can cause a scratch on the skin, a deep open cut, a puncture of the skin, a crush injury, tearing away of the skin or a body part, or a bone injury.  Your health care provider will examine your wound and ask for details about the animal and how the bite happened.  You may also have tests such as a blood test, X-ray, or testing of a fluid sample from your wound (culture test).  Treatment may include wound care, antibiotic medicine, a tetanus shot, and rabies treatment if the animal could have rabies.  This information is not intended to replace advice given to you by your health care provider. Make sure you discuss any questions you have with your health care provider.

## 2024-05-14 NOTE — ED PROCEDURE NOTE - PROCEDURE ADDITIONAL DETAILS
9cm laceration to the right elbow with .5cm and 3cm lacerations adjacent just distally.  1cm and 1.5cm lacerations to the dorsal aspect of the left hand.     8 sutures placed in 9cm laceration with openings to allow for drainage.  2 sutures placed in 3cm laceration with opening to allow for drainage.    .5cm, 1.0cm, and 1.5cm lacerations left open to reduce risk of infection.

## 2024-05-21 NOTE — ED ADULT TRIAGE NOTE - BSA (M2)
A/P:     1. Swelling of right ear  Recurrent.  We discussed briefly relapsing polychondritis.  She does not have any other areas of the body affected, but has a history of same swelling in the right ear, improved with steroids and antibiotics.  Will treat her with a prednisone taper and have her follow-up closely if her symptoms do not resolve or return.  - predniSONE (DELTASONE) 10 MG tablet; Take 4 tablets by mouth daily for 3 days, THEN 3 tablets daily for 3 days, THEN 2 tablets daily for 3 days, THEN 1 tablet daily for 3 days.  Dispense: 30 tablet; Refill: 0         Follow up:  prn         An electronic signature was used to authenticate this note.    --Heavenly Ovalles MD         HPI: Genet Stanton is here for an acute visit.      She started to have swelling in her R ear two days ago, and it is a bit better now, but not normal. Her ear itches and is tender. Her hearing is no worse than usual. She denies fever. This same thing happened in January, treated by urgent care with antibiotics and steroids. She did have a headache two days before the swelling, and it is still still there very mildly.  There is no family history of autoimmune disorder. She has no nasal congestion, swelling, history of red eye.       Current Outpatient Medications:     dulaglutide (TRULICITY) 1.5 MG/0.5ML SC injection, Inject 0.5 mLs into the skin once a week, Disp: 12 Adjustable Dose Pre-filled Pen Syringe, Rfl: 1    sertraline (ZOLOFT) 100 MG tablet, TAKE 1 TABLET BY MOUTH EVERY DAY, Disp: 90 tablet, Rfl: 1    buPROPion (WELLBUTRIN XL) 300 MG extended release tablet, TAKE 1 TABLET BY MOUTH EVERY DAY IN THE MORNING, Disp: 90 tablet, Rfl: 1    atorvastatin (LIPITOR) 20 MG tablet, TAKE 1 TABLET BY MOUTH EVERY DAY, Disp: 90 tablet, Rfl: 1    dexamethasone (DECADRON) 0.1 % ophthalmic solution, Apply 1-2 drops in each ear as needed for itching., Disp: , Rfl:     triamcinolone (KENALOG) 0.1 % ointment, Apply topically 2 times daily, Disp: ,  1.96

## 2024-05-22 ENCOUNTER — EMERGENCY (EMERGENCY)
Facility: HOSPITAL | Age: 49
LOS: 0 days | Discharge: HOME | End: 2024-05-22
Attending: EMERGENCY MEDICINE
Payer: COMMERCIAL

## 2024-05-22 VITALS
OXYGEN SATURATION: 99 % | HEART RATE: 85 BPM | RESPIRATION RATE: 18 BRPM | SYSTOLIC BLOOD PRESSURE: 145 MMHG | DIASTOLIC BLOOD PRESSURE: 97 MMHG | WEIGHT: 184.97 LBS | TEMPERATURE: 98 F

## 2024-05-22 DIAGNOSIS — Z88.0 ALLERGY STATUS TO PENICILLIN: ICD-10-CM

## 2024-05-22 DIAGNOSIS — Z87.891 PERSONAL HISTORY OF NICOTINE DEPENDENCE: ICD-10-CM

## 2024-05-22 DIAGNOSIS — Z48.02 ENCOUNTER FOR REMOVAL OF SUTURES: ICD-10-CM

## 2024-05-22 DIAGNOSIS — S51.011D LACERATION WITHOUT FOREIGN BODY OF RIGHT ELBOW, SUBSEQUENT ENCOUNTER: ICD-10-CM

## 2024-05-22 DIAGNOSIS — Z98.890 OTHER SPECIFIED POSTPROCEDURAL STATES: Chronic | ICD-10-CM

## 2024-05-22 PROCEDURE — 99212 OFFICE O/P EST SF 10 MIN: CPT

## 2024-05-22 PROCEDURE — L9995: CPT

## 2024-05-22 NOTE — ED PROVIDER NOTE - PATIENT PORTAL LINK FT
You can access the FollowMyHealth Patient Portal offered by [facility] by registering at the following website: [email or web address]. By joining [facility]’s FollowMyHealth portal, you will also be able to view your health information using other applications (apps) compatible with our system.

## 2024-05-22 NOTE — ED PROVIDER NOTE - PHYSICAL EXAMINATION
VITAL SIGNS: I have reviewed nursing notes and confirm.  CONSTITUTIONAL: Well-developed; well-nourished; in no acute distress.   SKIN: Wound healed with sutures intact, no dehiscence, mild erythema and warmth to the area, fully ranging elbow, no signs of lymphangitis  HEAD: Normocephalic; atraumatic.  EYES: conjunctiva and sclera clear.  ENT: No nasal discharge; airway clear.  EXT: Normal ROM.  No clubbing, cyanosis or edema.   NEURO: Alert, oriented, grossly unremarkable

## 2024-05-22 NOTE — ED PROVIDER NOTE - OBJECTIVE STATEMENT
Patient is a 48-year-old male here for suture removal after lack repair from dog bite 1 week ago.  Patient has noticed some redness and swelling to the area with mild drainage which has been improving over the past time.  Patient is on antibiotics currently and has been taking them as prescribed.  Patient denies fever, chills, purulence, streaking up the arm.

## 2024-05-22 NOTE — ED PROVIDER NOTE - CLINICAL SUMMARY MEDICAL DECISION MAKING FREE TEXT BOX
Patient here for suture removal sustained from dog bite about 1 week ago.  He has redness to the area but says that is actually improving has full range of motion of his elbow without any fever or chills or drainage. here sutures removed, pt has 2.5 days of abx left

## 2024-05-22 NOTE — ED PROVIDER NOTE - NSFOLLOWUPINSTRUCTIONS_ED_ALL_ED_FT
Return back to the emergency department if redness worsens, or develop fever.  Take antibiotics as discussed.

## 2024-05-25 ENCOUNTER — EMERGENCY (EMERGENCY)
Facility: HOSPITAL | Age: 49
LOS: 0 days | Discharge: HOME | End: 2024-05-25
Attending: EMERGENCY MEDICINE
Payer: COMMERCIAL

## 2024-05-25 VITALS
HEART RATE: 92 BPM | TEMPERATURE: 98 F | SYSTOLIC BLOOD PRESSURE: 151 MMHG | RESPIRATION RATE: 18 BRPM | DIASTOLIC BLOOD PRESSURE: 87 MMHG | OXYGEN SATURATION: 98 %

## 2024-05-25 DIAGNOSIS — T81.89XA OTHER COMPLICATIONS OF PROCEDURES, NOT ELSEWHERE CLASSIFIED, INITIAL ENCOUNTER: ICD-10-CM

## 2024-05-25 DIAGNOSIS — Z98.890 OTHER SPECIFIED POSTPROCEDURAL STATES: Chronic | ICD-10-CM

## 2024-05-25 DIAGNOSIS — Z88.0 ALLERGY STATUS TO PENICILLIN: ICD-10-CM

## 2024-05-25 PROCEDURE — 99282 EMERGENCY DEPT VISIT SF MDM: CPT

## 2024-05-25 PROCEDURE — 99283 EMERGENCY DEPT VISIT LOW MDM: CPT

## 2024-05-25 NOTE — ED PROVIDER NOTE - PHYSICAL EXAMINATION
vital signs: I have reviewed the initial vital signs  constitutional: no acute distress, normocephalic  msk: extremity good rom, no bony tenderness, no deformity, good cap refill, no tendon injury , no foreign bodies  skin: +laceration large flap to right extensor surface elbow with yellowish drainage, , without any tendon injury , no bleeding from wound, no swelling or bruising  neuro: no sensory or motor deficits of extremity. no focal deficits, gait steady, AOx3  heme: no lymphangitis

## 2024-05-25 NOTE — ED PROVIDER NOTE - OBJECTIVE STATEMENT
49 y/o male presents to the ED with right elbow wound check . patient s/p dog bite to right elbow sustaining large flap laceration. patient sutures placed and removed 9 days after placement. patient currently taking clindamycin. patient with yellow drainage appreciated with resolving redness and swelling. patient with good rom of elbow. no streaking up arm or fevers. no tingling distally

## 2024-05-25 NOTE — ED PROVIDER NOTE - ATTENDING APP SHARED VISIT CONTRIBUTION OF CARE
Patient is a 48-year-old male with right elbow laceration.  Sutures were taken out 2 days ago wound continues to ooze so came in for wound check.  No fever.  Adherent to antibiotic regimen.    Exam: Right elbow healing laceration, mild erythema without tenderness, good range of motion, no acute distress  Plan: Local wound care

## 2024-05-25 NOTE — ED PROVIDER NOTE - NS_EDPROVIDERDISPOUSERTYPE_ED_A_ED
OFFICE VISIT    Patient: Camilo Lopez   : 1973 MRN: [id number]    SUBJECTIVE:  Chief Complaint   Patient presents with   • Follow-up     3 month follow up       A 50 year old male presents for      Patient has given consent to record this visit for documentation in their clinical record.    HISTORY OF PRESENT ILLNESS:     Historian: Self and accompanied by the wife    PEDRO on CPAP: He recently underwent a sleep study that revealedsevere sleep apnea. He started using CPAP 1 month ago and has been using it for 4 hours on average. He has noticed some improvement in day time fatigue but he has a history of hypersomnia so remains on  Armodafinil. He also sleeps on the chair first then eventually gets to bed and uses CPAP then.    IFG (impaired fasting glucose): He has a history of impaired fasting glucose. Not taking any medications.    Chronic obstructive pulmonary disease, unspecified COPD type (CMD): He is having chronic cough for over 1 year. Last CT chest lung screening showed emphysematous changes    Screening PSA (prostate specific antigen): Due for PSA.    Need for vaccination: Due for penumonia vaccine and shingles vaccine.    PAST MEDICAL HISTORY:  Past Medical History:   Diagnosis Date   • Bilateral chronic knee pain    • Fatigue    • Full dentures     but usually only wears Upper   • GERD (gastroesophageal reflux disease)    • Hearing loss    • Hypersomnia    • Pharyngitis    • Thumb fracture     Right   • Wears hearing aid in both ears    • Wears prescription eyeglasses      MEDICATIONS:  Current Outpatient Medications   Medication Sig Dispense Refill   • albuterol 108 (90 Base) MCG/ACT inhaler Inhale 1 puff into the lungs every 4 hours as needed for Shortness of Breath or Wheezing. 1 each 1   • rosuvastatin (CRESTOR) 10 MG tablet TAKE 1 TABLET BY MOUTH DAILY 90 tablet 0   • armodafinil 250 MG tablet Take 1 tablet by mouth daily. 30 tablet 2   • sildenafil (REVATIO) 20 MG tablet Take 2-5 tab daily PRN  prior to activity 50 tablet 0   • acetaminophen (TYLENOL) 500 MG tablet Take 500 mg by mouth every 6 hours as needed for Pain.       No current facility-administered medications for this visit.     ALLERGIES:  Allergies as of 11/29/2023   • (No Known Allergies)     FAMILY HISTORY:  Family History   Problem Relation Age of Onset   • Diabetes Mother    • Hypertension Mother    • Diabetes Father    • Cancer Neg Hx      SOCIAL HISTORY:  Social History     Tobacco Use   • Smoking status: Every Day     Current packs/day: 1.00     Average packs/day: 1 pack/day for 30.0 years (30.0 ttl pk-yrs)     Types: Cigarettes   • Smokeless tobacco: Never   Vaping Use   • Vaping Use: never used   Substance Use Topics   • Alcohol use: Yes     Alcohol/week: 0.0 standard drinks of alcohol     Comment: Rare   • Drug use: Never     Past Surgical HISTORY  Past Surgical History:   Procedure Laterality Date   • Colonoscopy diagnostic  11/30/2021    Dr. Grier colonoscopy 5 years recall   • Drain or inject large joint or bursa wout ultrasound guide Bilateral 05/15/2019    Bilateral knees. Lidocaine and triamcinolone. (Dr.Todd Parsons)   • Inguinal hernia repair Right 1985   • Inguinal hernia repair Left 09/03/2010    Inguinal Hernia Repair (Dr. Sharad Sauer)   • Joint replacement Left 10/2021    knee    • Oral surgery procedure      -> full dentures       REVIEW OF SYSTEMS:  Constitutional: Negative for fever, chills, or fatigue.  Eyes: Negative for visual changes.  ENT: Negative for rhinorrhea, ear pain or sore throat.  Respiratory: Negative cough or shortness of breath.    Cardiovascular: Negative for chest pain, palpitations.   Gastrointestinal: Negative for nausea, vomiting, diarrhea or abdominal pain.   Genitourinary: Negative for dysuria, urgency, frequency.  Extremities:  Negative for joint swelling.  Neurologic:  Negative for headache.      OBJECTIVE:  Visit Vitals  /72 (BP Location: RUE - Right upper extremity, Patient  Position: Sitting, Cuff Size: Regular)   Pulse 85   Temp 98 °F (36.7 °C) (Temporal)   Resp 18   Ht 5' 8\" (1.727 m)   Wt 96.2 kg (212 lb)   SpO2 97%   BMI 32.23 kg/m²       PHYSICAL EXAM:  Constitutional: Alert, in no acute distress and current vital signs reviewed.  Head and Face: Atraumatic and normocephalic.  Eyes: No discharge, no eyelid swelling and the sclerae were normal.  ENT: Oropharynx normal. Normal appearing outer ear.  Neck: Normal appearing neck and supple neck.  Pulmonary: Breath sounds clear to auscultation bilaterally, but no respiratory distress and normal respiratory rate and effort.  Cardiovascular: Normal rate, regular rhythm, normal S1, normal S2 and edema was not present in the lower extremities.  Abdomen: Soft and nontender.  Skin, Hair, Nails: Normal skin color and pigmentation.  Psychiatric: Alert and awake, interactive and mood/affect were appropriate.    DIAGNOSTIC STUDIES:  LAB RESULTS:  Lab Services on 09/09/2023   Component Date Value Ref Range Status   • Sodium 09/09/2023 136  135 - 145 mmol/L Final   • Potassium 09/09/2023 4.5  3.4 - 5.1 mmol/L Final   • Chloride 09/09/2023 102  97 - 110 mmol/L Final   • Carbon Dioxide 09/09/2023 29  21 - 32 mmol/L Final   • Anion Gap 09/09/2023 10  7 - 19 mmol/L Final   • Glucose 09/09/2023 107 (H)  70 - 99 mg/dL Final   • BUN 09/09/2023 14  6 - 20 mg/dL Final   • Creatinine 09/09/2023 1.04  0.67 - 1.17 mg/dL Final   • Glomerular Filtration Rate 09/09/2023 86  >=60 Final   • BUN/Cr 09/09/2023 13  7 - 25 Final   • Calcium 09/09/2023 8.7  8.4 - 10.2 mg/dL Final   • Bilirubin, Total 09/09/2023 0.5  0.2 - 1.0 mg/dL Final   • GOT/AST 09/09/2023 17  <=37 Units/L Final   • GPT/ALT 09/09/2023 9  <64 Units/L Final   • Alkaline Phosphatase 09/09/2023 89  45 - 117 Units/L Final   • Albumin 09/09/2023 4.0  3.6 - 5.1 g/dL Final   • Protein, Total 09/09/2023 7.2  6.4 - 8.2 g/dL Final   • Globulin 09/09/2023 3.2  2.0 - 4.0 g/dL Final   • A/G Ratio 09/09/2023 1.3   1.0 - 2.4 Final   • Cholesterol 09/09/2023 160  <=199 mg/dL Final   • Triglycerides 09/09/2023 119  <=149 mg/dL Final   • HDL 09/09/2023 35 (L)  >=40 mg/dL Final   • LDL 09/09/2023 101  <=129 mg/dL Final   • Non-HDL Cholesterol 09/09/2023 125  mg/dL Final   • Cholesterol/ HDL Ratio 09/09/2023 4.6 (H)  <=4.4 Final   • Testosterone, Male 09/09/2023 508.6  280.0 - 1,100.0 ng/dL Final   • COLOR, URINALYSIS 09/09/2023 Yellow   Final   • APPEARANCE, URINALYSIS 09/09/2023 Clear   Final   • GLUCOSE, URINALYSIS 09/09/2023 Negative  Negative mg/dL Final   • BILIRUBIN, URINALYSIS 09/09/2023 Negative  Negative Final   • KETONES, URINALYSIS 09/09/2023 Negative  Negative mg/dL Final   • SPECIFIC GRAVITY, URINALYSIS 09/09/2023 1.015  1.005 - 1.030 Final   • OCCULT BLOOD, URINALYSIS 09/09/2023 Trace (A)  Negative Final   • PH, URINALYSIS 09/09/2023 6.5  5.0 - 7.0 Final   • PROTEIN, URINALYSIS 09/09/2023 Negative  Negative mg/dL Final   • UROBILINOGEN, URINALYSIS 09/09/2023 1.0  0.2, 1.0 mg/dL Final   • NITRITE, URINALYSIS 09/09/2023 Negative  Negative Final   • LEUKOCYTE ESTERASE, URINALYSIS 09/09/2023 Negative  Negative Final   • SQUAMOUS EPITHELIAL, URINALYSIS 09/09/2023 None Seen  None Seen, 1 to 5 /hpf Final   • ERYTHROCYTES, URINALYSIS 09/09/2023 1 to 2  None Seen, 1 to 2 /hpf Final   • LEUKOCYTES, URINALYSIS 09/09/2023 None Seen  None Seen, 1 to 5 /hpf Final   • BACTERIA, URINALYSIS 09/09/2023 None Seen  None Seen /hpf Final   • HYALINE CASTS, URINALYSIS 09/09/2023 None Seen  None Seen, 1 to 5 /lpf Final   • MUCUS 09/09/2023 Present   Final   • TSH 09/09/2023 2.272  0.350 - 5.000 mcUnits/mL Final   • WBC 09/09/2023 7.4  4.2 - 11.0 K/mcL Final   • RBC 09/09/2023 5.14  4.50 - 5.90 mil/mcL Final   • HGB 09/09/2023 15.8  13.0 - 17.0 g/dL Final   • HCT 09/09/2023 46.4  39.0 - 51.0 % Final   • MCV 09/09/2023 90.3  78.0 - 100.0 fl Final   • MCH 09/09/2023 30.7  26.0 - 34.0 pg Final   • MCHC 09/09/2023 34.1  32.0 - 36.5 g/dL Final    • RDW-CV 09/09/2023 12.5  11.0 - 15.0 % Final   • RDW-SD 09/09/2023 41.2  39.0 - 50.0 fL Final   • PLT 09/09/2023 294  140 - 450 K/mcL Final   • NRBC 09/09/2023 0  <=0 /100 WBC Final   • Neutrophil, Percent 09/09/2023 56  % Final   • Lymphocytes, Percent 09/09/2023 30  % Final   • Mono, Percent 09/09/2023 10  % Final   • Eosinophils, Percent 09/09/2023 3  % Final   • Basophils, Percent 09/09/2023 1  % Final   • Immature Granulocytes 09/09/2023 0  % Final   • Absolute Neutrophils 09/09/2023 4.2  1.8 - 7.7 K/mcL Final   • Absolute Lymphocytes 09/09/2023 2.2  1.0 - 4.0 K/mcL Final   • Absolute Monocytes 09/09/2023 0.8  0.3 - 0.9 K/mcL Final   • Absolute Eosinophils  09/09/2023 0.2  0.0 - 0.5 K/mcL Final   • Absolute Basophils 09/09/2023 0.1  0.0 - 0.3 K/mcL Final   • Absolute Immature Granulocytes 09/09/2023 0.0  0.0 - 0.2 K/mcL Final       ASSESSMENT AND PLAN:  This 50 year old male presents with :  1. PEDRO on CPAP:  Patient has started to be more compliant with his AutoPAP machine.   Encouraged adequate sleep hygiene and continued to increase use.      2. Hypersomnia:  Discussed with patient and wife regarding use of Armodafinil.   If sleep apnea improves his daytime fatigue symptoms, he may be able to eventually wean off of Armodafinil.  Okay to continue refills for now.  Will also consider sleep medicine evaluation to discuss this further. They are agreeable.      3. IFG (impaired fasting glucose):  Check A1c with next labs. No current medication.  Ordered Glycohemoglobin; Future.     4. Chronic obstructive pulmonary disease, unspecified COPD type (CMD):  Patient has a chronic cough.  Reviewed previously CT chest lung screening findings.   Recommend trial of Albuterol. Indication, use, and side effects reviewed in detail.  He is agreeable to this. If symptoms improve, will start maintenance inhaler.   Patient is also due for a repeat CT chest lungs screen in February 2024.    Prescribed Albuterol 108 (90 Base)  MCG/ACT inhaler; Inhale 1 puff into the lungs every 4 hours as needed for Shortness of Breath or Wheezing.  Dispense: 1 each; Refill: 1.   5. Screening PSA (prostate specific antigen):  Ordered PSA; Future.      6. Need for vaccination:  Administered PNEUMOCOCCAL CONJUGATE 20 VALENT VACC (PREVNAR-20) in the office today.  Administered ZOSTER SHINGLES RECOMBINANT ADJUVANTED IM(SHINGRIX) in the office today.    Follow up three months, labs prior.       Orders Placed This Encounter   • PNEUMOCOCCAL CONJUGATE 20 VALENT VACC (PREVNAR-20)   • ZOSTER SHINGLES RECOMBINANT ADJUVANTED IM(SHINGRIX)   • Glycohemoglobin   • PSA   • albuterol 108 (90 Base) MCG/ACT inhaler       Refer to orders.  Medical compliance with plan discussed and risks of non-compliance reviewed.  Patient education completed on disease process, etiology & prognosis.  Proper usage and side effects of medications reviewed & discussed.  Return to clinic as clinically indicated as discussed with the patient who verbalized understanding of the plan and is in agreement with the plan.    [place], have created a visit summary document based on the audio recording between Dr. Roly Starkey MD and this patient for the physician to review, edit as needed, and authenticate.    Creation Date: 11/30/2023     I have reviewed and edited the visit summary above and attest that it is accurate.    Roly Starkey MD     Attending Attestation (For Attendings USE Only)...

## 2024-05-25 NOTE — ED ADULT NURSE NOTE - NSFALLUNIVINTERV_ED_ALL_ED
Bed/Stretcher in lowest position, wheels locked, appropriate side rails in place/Call bell, personal items and telephone in reach/Instruct patient to call for assistance before getting out of bed/chair/stretcher/Non-slip footwear applied when patient is off stretcher/[place] to call system/Physically safe environment - no spills, clutter or unnecessary equipment/Purposeful proactive rounding/Room/bathroom lighting operational, light cord in reach

## 2024-05-25 NOTE — ED PROVIDER NOTE - PATIENT PORTAL LINK FT
You can access the FollowMyHealth Patient Portal offered by [facility] by registering at the following website: [email or web address]. By joining [facility]’s FollowMyHealth portal, you will also be able to view your health information using other applications (apps) compatible with our system.

## 2024-09-30 NOTE — ED ADULT NURSE NOTE - AS PAIN REST
[FreeTextEntry1] : 53 yo p2 here for annual exam kids 27,26 A&W meds-none occ  dexa showed osteopenia, low risk.  denies gyn co. sexually active, no co. lubricants   4 (moderate pain)

## 2025-03-20 ENCOUNTER — EMERGENCY (EMERGENCY)
Facility: HOSPITAL | Age: 50
LOS: 0 days | Discharge: HOME | End: 2025-03-21
Attending: STUDENT IN AN ORGANIZED HEALTH CARE EDUCATION/TRAINING PROGRAM
Payer: COMMERCIAL

## 2025-03-20 VITALS
HEIGHT: 67 IN | SYSTOLIC BLOOD PRESSURE: 158 MMHG | WEIGHT: 179.02 LBS | DIASTOLIC BLOOD PRESSURE: 93 MMHG | TEMPERATURE: 98 F | HEART RATE: 88 BPM | OXYGEN SATURATION: 96 % | RESPIRATION RATE: 20 BRPM

## 2025-03-20 DIAGNOSIS — Z98.890 OTHER SPECIFIED POSTPROCEDURAL STATES: Chronic | ICD-10-CM

## 2025-03-20 LAB
BASOPHILS # BLD AUTO: 0.05 K/UL — SIGNIFICANT CHANGE UP (ref 0–0.2)
BASOPHILS NFR BLD AUTO: 0.6 % — SIGNIFICANT CHANGE UP (ref 0–1)
EOSINOPHIL # BLD AUTO: 0.21 K/UL — SIGNIFICANT CHANGE UP (ref 0–0.7)
EOSINOPHIL NFR BLD AUTO: 2.5 % — SIGNIFICANT CHANGE UP (ref 0–8)
HCT VFR BLD CALC: 44.4 % — SIGNIFICANT CHANGE UP (ref 42–52)
HGB BLD-MCNC: 15.1 G/DL — SIGNIFICANT CHANGE UP (ref 14–18)
IMM GRANULOCYTES NFR BLD AUTO: 0.2 % — SIGNIFICANT CHANGE UP (ref 0.1–0.3)
LYMPHOCYTES # BLD AUTO: 1.48 K/UL — SIGNIFICANT CHANGE UP (ref 1.2–3.4)
LYMPHOCYTES # BLD AUTO: 17.4 % — LOW (ref 20.5–51.1)
MCHC RBC-ENTMCNC: 29.9 PG — SIGNIFICANT CHANGE UP (ref 27–31)
MCHC RBC-ENTMCNC: 34 G/DL — SIGNIFICANT CHANGE UP (ref 32–37)
MCV RBC AUTO: 87.9 FL — SIGNIFICANT CHANGE UP (ref 80–94)
MONOCYTES # BLD AUTO: 0.69 K/UL — HIGH (ref 0.1–0.6)
MONOCYTES NFR BLD AUTO: 8.1 % — SIGNIFICANT CHANGE UP (ref 1.7–9.3)
NEUTROPHILS # BLD AUTO: 6.07 K/UL — SIGNIFICANT CHANGE UP (ref 1.4–6.5)
NEUTROPHILS NFR BLD AUTO: 71.2 % — SIGNIFICANT CHANGE UP (ref 42.2–75.2)
NRBC BLD AUTO-RTO: 0 /100 WBCS — SIGNIFICANT CHANGE UP (ref 0–0)
PLATELET # BLD AUTO: 234 K/UL — SIGNIFICANT CHANGE UP (ref 130–400)
PMV BLD: 8.9 FL — SIGNIFICANT CHANGE UP (ref 7.4–10.4)
RBC # BLD: 5.05 M/UL — SIGNIFICANT CHANGE UP (ref 4.7–6.1)
RBC # FLD: 12.8 % — SIGNIFICANT CHANGE UP (ref 11.5–14.5)
WBC # BLD: 8.52 K/UL — SIGNIFICANT CHANGE UP (ref 4.8–10.8)
WBC # FLD AUTO: 8.52 K/UL — SIGNIFICANT CHANGE UP (ref 4.8–10.8)

## 2025-03-20 PROCEDURE — 73564 X-RAY EXAM KNEE 4 OR MORE: CPT | Mod: 26,RT

## 2025-03-20 PROCEDURE — 20610 DRAIN/INJ JOINT/BURSA W/O US: CPT | Mod: RT

## 2025-03-20 PROCEDURE — 86140 C-REACTIVE PROTEIN: CPT

## 2025-03-20 PROCEDURE — 73564 X-RAY EXAM KNEE 4 OR MORE: CPT | Mod: RT

## 2025-03-20 PROCEDURE — 99284 EMERGENCY DEPT VISIT MOD MDM: CPT | Mod: 25

## 2025-03-20 PROCEDURE — 80053 COMPREHEN METABOLIC PANEL: CPT

## 2025-03-20 PROCEDURE — 85652 RBC SED RATE AUTOMATED: CPT

## 2025-03-20 PROCEDURE — 85025 COMPLETE CBC W/AUTO DIFF WBC: CPT

## 2025-03-20 PROCEDURE — 36415 COLL VENOUS BLD VENIPUNCTURE: CPT

## 2025-03-20 PROCEDURE — 36000 PLACE NEEDLE IN VEIN: CPT | Mod: XU

## 2025-03-20 RX ORDER — LIDOCAINE HCL/PF 10 MG/ML
10 VIAL (ML) INJECTION ONCE
Refills: 0 | Status: COMPLETED | OUTPATIENT
Start: 2025-03-20 | End: 2025-03-20

## 2025-03-20 RX ORDER — IBUPROFEN 200 MG
600 TABLET ORAL ONCE
Refills: 0 | Status: COMPLETED | OUTPATIENT
Start: 2025-03-20 | End: 2025-03-20

## 2025-03-20 RX ADMIN — Medication 600 MILLIGRAM(S): at 22:33

## 2025-03-20 RX ADMIN — Medication 10 MILLILITER(S): at 23:13

## 2025-03-20 NOTE — ED ADULT TRIAGE NOTE - SOURCE OF INFORMATION
Encounter Date: 7/14/2024       History     Chief Complaint   Patient presents with    Urinary Tract Infection     Increased confusion starting Friday      82-year-old female presented emergency department with decreased level of consciousness per EMS.  Patient nursing home and patient was slightly confused per nursing home staff and had decreased level of consciousness.  Patient recently had urinary tract infections.  Patient denies any acute complaints other than pain in the sacral region where she has a decubitus ulcer.  Denies fever or chills or nausea vomiting or weakness or numbness.  Patient however knows her name and is able to answer some focused questions      Review of patient's allergies indicates:   Allergen Reactions    Oxycodone hcl-oxycodone-asa      Other reaction(s): Unknown    Sulfa (sulfonamide antibiotics) Other (See Comments)     Other reaction(s): Unknown    Sulfamethoxazole-trimethoprim Other (See Comments)     Other reaction(s): Unknown     Past Medical History:   Diagnosis Date    Anxiety     Breast cancer     Diabetes mellitus     Hypertension     Skin cancer      Past Surgical History:   Procedure Laterality Date    BREAST LUMPECTOMY      HYSTERECTOMY       Family History   Problem Relation Name Age of Onset    Coronary artery disease Mother      Coronary artery disease Father      Melanoma Neg Hx      Psoriasis Neg Hx      Lupus Neg Hx      Eczema Neg Hx       Social History     Tobacco Use    Smoking status: Never    Smokeless tobacco: Never   Substance Use Topics    Alcohol use: No    Drug use: No     Review of Systems   Unable to perform ROS: Mental status change   Constitutional: Negative.    HENT: Negative.     Eyes: Negative.    Respiratory: Negative.     Cardiovascular: Negative.  Negative for chest pain.   Gastrointestinal: Negative.    Endocrine: Negative.    Genitourinary: Negative.    Musculoskeletal: Negative.         Sore in the back   Skin:  Positive for wound.    Allergic/Immunologic: Negative.    Neurological: Negative.    Hematological: Negative.    Psychiatric/Behavioral:  Positive for confusion.        Physical Exam     Initial Vitals [07/14/24 1048]   BP Pulse Resp Temp SpO2   125/60 92 18 98.4 °F (36.9 °C) 100 %      MAP       --         Physical Exam    Nursing note and vitals reviewed.  Constitutional: She appears well-developed and well-nourished.   HENT:   Head: Normocephalic and atraumatic.   Nose: Nose normal.   Mouth/Throat: Oropharynx is clear and moist.   Eyes: Conjunctivae and EOM are normal. Pupils are equal, round, and reactive to light.   Neck: Neck supple. No thyromegaly present. No tracheal deviation present. No JVD present.   Normal range of motion.  Cardiovascular:  Normal rate, regular rhythm, normal heart sounds and intact distal pulses.           No murmur heard.  Pulmonary/Chest: Breath sounds normal. No stridor. No respiratory distress. She has no wheezes. She has no rales.   Abdominal: Abdomen is soft. Bowel sounds are normal. There is no abdominal tenderness.   Musculoskeletal:         General: No edema. Normal range of motion.      Cervical back: Normal range of motion and neck supple.      Comments: Stage III sacral decubitus wound noted     Neurological:   Awake and able to answer some focused questions and can tell me her name.  Able to move all extremities.  Has generalized weakness however denies any focal weakness and able to follow commands   Skin: Skin is warm. Capillary refill takes less than 2 seconds.   Psychiatric:   Slow to respond and oriented to person         ED Course   Procedures  Labs Reviewed   CBC W/ AUTO DIFFERENTIAL - Abnormal; Notable for the following components:       Result Value    RBC 3.84 (*)     Hemoglobin 10.7 (*)     Hematocrit 34.4 (*)     MCHC 31.1 (*)     RDW 15.3 (*)     Lymph # 0.9 (*)     Gran % 79.8 (*)     Lymph % 12.0 (*)     All other components within normal limits   URINALYSIS, REFLEX TO URINE  CULTURE - Abnormal; Notable for the following components:    Color, UA Orange (*)     Appearance, UA Cloudy (*)     Protein, UA 2+ (*)     Glucose, UA Trace (*)     Occult Blood UA 1+ (*)     Nitrite, UA Positive (*)     Leukocytes, UA 3+ (*)     All other components within normal limits    Narrative:     Specimen Source->Urine   PROTIME-INR - Abnormal; Notable for the following components:    Prothrombin Time 16.8 (*)     INR 1.6 (*)     All other components within normal limits   URINALYSIS MICROSCOPIC - Abnormal; Notable for the following components:    RBC, UA 5 (*)     WBC, UA >100 (*)     WBC Clumps, UA Moderate (*)     Bacteria Many (*)     All other components within normal limits    Narrative:     Specimen Source->Urine   INFLUENZA A & B BY MOLECULAR   CULTURE, BLOOD   CULTURE, BLOOD   CULTURE, URINE   COMPREHENSIVE METABOLIC PANEL   LACTIC ACID, PLASMA   B-TYPE NATRIURETIC PEPTIDE   TROPONIN I   PROCALCITONIN   MAGNESIUM   LACTIC ACID, PLASMA     EKG Readings: (Independently Interpreted)   Initial Reading: No STEMI. Rhythm: Atrial Fibrillation. Ectopy: No Ectopy. Conduction: Normal.       Imaging Results              CT Head Without Contrast (Final result)  Result time 07/14/24 11:50:56      Final result by Evelyn Aguilar MD (07/14/24 11:50:56)                   Impression:      .  There is a stable appearance of cerebral atrophy with proportional ventricular dilatation.  There is no evidence acute intracranial process.      Electronically signed by: Evelyn Aguilar MD  Date:    07/14/2024  Time:    11:50               Narrative:    EXAMINATION:  CT HEAD WITHOUT CONTRAST    CLINICAL HISTORY:  Mental status change, unknown cause;    TECHNIQUE:  Low dose axial images were obtained through the head.  Coronal and sagittal reformations were also performed. Contrast was not administered.    COMPARISON:  MRI 04/19/2022    FINDINGS:  The there is cerebral atrophy with ventricular dilatation.  The calvarium is  intact.  There is no evidence acute intracranial hemorrhage, contusion, extra-axial fluid collection or midline shift peer                                       X-Ray Pelvis Routine AP (Final result)  Result time 07/14/24 11:46:05      Final result by Evelyn Aguilar MD (07/14/24 11:46:05)                   Impression:      There are degenerative changes of both hips and the lumbar spine.      Electronically signed by: Evelyn Aguilar MD  Date:    07/14/2024  Time:    11:46               Narrative:    EXAMINATION:  XR PELVIS ROUTINE AP    CLINICAL HISTORY:  Sacral decubitus;    TECHNIQUE:  AP view of the pelvis was performed.    COMPARISON:  None.    FINDINGS:  Both hips are located.  There is joint space narrowing with adjacent sclerosis.  There are vascular calcifications.  There is no evidence displaced fracture.                                       X-Ray Chest AP Portable (Final result)  Result time 07/14/24 11:41:28      Final result by Evelyn Aguilar MD (07/14/24 11:41:28)                   Impression:      No acute abnormality.      Electronically signed by: Evelyn Aguilar MD  Date:    07/14/2024  Time:    11:41               Narrative:    EXAMINATION:  XR CHEST AP PORTABLE    CLINICAL HISTORY:  Sepsis;    TECHNIQUE:  Single frontal view of the chest was performed.    COMPARISON:  None    FINDINGS:  The lungs are clear, with normal appearance of pulmonary vasculature and no pleural effusion or pneumothorax.    The cardiac silhouette is normal in size. The hilar and mediastinal contours are unremarkable.    Bones are intact.                                    X-Rays:   Independently Interpreted Readings:   Other Readings:  Pelvic x-ray does not show any acute findings.  CT head does not show any acute intracranial hemorrhage    Medications   vancomycin (VANCOCIN) 1,000 mg in D5W 250 mL IVPB (Vial-Mate) (has no administration in time range)   fluconazole (DIFLUCAN) IVPB 400 mg (has no administration in  time range)   fluconazole (DIFLUCAN) IVPB 200 mg (has no administration in time range)   0.9%  NaCl infusion (has no administration in time range)   cefTRIAXone (ROCEPHIN) 2 g in D5W 100 mL IVPB (MB+) (has no administration in time range)   levETIRAcetam injection 750 mg (has no administration in time range)   rivaroxaban tablet 20 mg (has no administration in time range)   donepeziL tablet 5 mg (has no administration in time range)   ondansetron injection 4 mg (has no administration in time range)   acetaminophen tablet 650 mg (has no administration in time range)   cefTRIAXone (ROCEPHIN) 2 g in D5W 100 mL IVPB (MB+) (0 g Intravenous Stopped 7/14/24 1254)     Medical Decision Making  82-year-old female presented emergency department with decreased level of consciousness.  Likely secondary to encephalopathy from urinary infection.  Also has sacral decubitus wound.  Will need wound care.  Screening labs and workup done and given patient's presentation Hospital Medicine consulted for evaluation for further management and treatment.  Broad-spectrum antibiotics started    Amount and/or Complexity of Data Reviewed  Labs: ordered. Decision-making details documented in ED Course.  Radiology: ordered. Decision-making details documented in ED Course.                                      Clinical Impression:  Final diagnoses:  [R53.1] Weakness  [N39.0] Urinary tract infection without hematuria, site unspecified (Primary)  [G93.40] Encephalopathy acute  [L89.153] Sacral decubitus ulcer, stage III          ED Disposition Condition    Observation                 Shlomo Motley MD  07/14/24 [id number]     Patient

## 2025-03-20 NOTE — ED PROCEDURE NOTE - CPROC ED ARTHRO DETAIL1
for aspiration/for injection of medication (see medication)/for aspiration and injection of medication (see medication)

## 2025-03-20 NOTE — ED PROVIDER NOTE - PHYSICAL EXAMINATION
CONSTITUTIONAL: NAD  SKIN: Warm dry  HEAD: NCAT  EYES: NL inspection  EXT: Right knee swollen relative to left, minimal overlying erythema.  Most TTP to lateral inferior aspect with significant fluctuance.  Full range of motion of bilateral knees and ankles.  Able to ambulate.  NEURO: Grossly unremarkable  PSYCH: Cooperative, appropriate.

## 2025-03-20 NOTE — ED PROVIDER NOTE - PATIENT PORTAL LINK FT
You can access the FollowMyHealth Patient Portal offered by [facility] by registering at the following website: [email or web address]. By joining [employer]’s FollowMyHealth portal, you will also be able to view your health information using other applications (apps) compatible with our system.

## 2025-03-20 NOTE — ED ADULT NURSE NOTE - NSFALLUNIVINTERV_ED_ALL_ED
Bed/Stretcher in lowest position, wheels locked, appropriate side rails in place/Call bell, personal items and telephone in reach/Instruct patient to call for assistance before getting out of bed/chair/stretcher/Non-slip footwear applied when patient is off stretcher/[place] to call system/Physically safe environment - no spills, clutter or unnecessary equipment/Purposeful proactive rounding/Room/bathroom lighting operational, light cord in reach

## 2025-03-20 NOTE — ED ADULT NURSE NOTE - NSFALLLASTSIX_ED_ALL_ED
Normal vision: sees adequately in most situations; can see medication labels, newsprint/eye glasses for distance/reading
No.

## 2025-03-20 NOTE — ED PROVIDER NOTE - NSFOLLOWUPINSTRUCTIONS_ED_ALL_ED_FT
Removing Fluid in the Knee (Arthrocentesis): What to Expect  Fluid may need to be removed from your knee with a needle.    The procedure to remove fluid is called knee arthrocentesis. It may be done:  To relieve pressure in your knee.  To get rid of extra fluid before you get a shot of medicine in your knee.  To get a sample of fluid for testing. This can help you find out if you have certain knee conditions.  To help with symptoms caused by fluid in the knee. These include pain, swelling, and stiffness.  Tell a health care provider about:  Any allergies you have.  All medicines you take. These include vitamins, herbs, eye drops, and creams.  Any problems you or family members have had with anesthesia.  Any bleeding problems you have.  Any surgeries you've had.  Any medical conditions you have.  Whether you're pregnant or may be pregnant.  What are the risks?  Your health care provider will talk with you about risks. These may include:  Infection.  Bleeding.  Allergies to medicines.  Damage to:  Blood vessels.  Nerves.  Tendons. These are the tissues that connect muscle to bone.  Other tissues in the knee joint.  What happens before the procedure?  Medicines    Ask about changing or stopping:  Any medicines you take.  Any vitamins, herbs, or supplements you take.  Do not take aspirin or ibuprofen unless you're told to.  Procedure safety    For your safety, you may:  Need to wash your skin with a soap that kills germs.  Get antibiotics.  Have your procedure site marked.  General instructions    Eat and drink only as you've been told.  Ask if you'll be staying overnight in the hospital.  If you'll be going home right after the procedure, plan to have a responsible adult:  Drive you home from the hospital or clinic. You won't be allowed to drive.  Stay with you for the time you're told.  What happens during the procedure?  A needle being used to take fluid from the knee.  You'll sit or lie down.  You may be given:  A sedative to help you relax.  Anesthesia to keep you from feeling pain.  An ultrasound may be used to help guide the needle into the right place. It'll make pictures of the inside of your body.  A long, thin needle will be put into the side of your knee joint. You may feel some pressure.  Fluid will be removed from your knee. Your provider may press on your knee to help get as much fluid as possible.  The needle will be taken out.  A bandage will be put over the spot where the needle went in.  These steps may vary. Ask what you can expect.    What happens after the procedure?  You may be watched closely until you leave. This includes checking your pain level, blood pressure, heart rate, and breathing rate.  If you were given a sedative, do not drive or use machines until you're told it's safe. A sedative can make you sleepy.  If you had a fluid sample taken for testing, ask when the results will be ready and how to get them. You may need to call or meet with your provider to get the results.  This information is not intended to replace advice given to you by your health care provider. Make sure you discuss any questions you have with your health care provider.

## 2025-03-20 NOTE — ED PROVIDER NOTE - CLINICAL SUMMARY MEDICAL DECISION MAKING FREE TEXT BOX
Pt presented with knee pain/swelling after working all day on knees. Arthrocentesis performed. Blood returned. Suspect hemarthrosis. Labs normal. low suspicion for septic arthritis. No fevers, no other systemic symptoms.  Tiago PLATT

## 2025-03-20 NOTE — ED PROVIDER NOTE - OBJECTIVE STATEMENT
49-year-old male no notable past medical history coming in with complaints of right knee pain.  Reports that he had acute onset right knee swelling earlier today, able to ambulate although with some difficulty.  No history of gout.  Denies any recent fevers, chills, trauma, or any other systemic symptoms. 49-year-old male no notable past medical history coming in with complaints of right knee pain.  Reports that he had acute onset right knee swelling earlier today, able to ambulate although with some difficulty.  Works as a [employer]. No history of gout.  Denies any recent fevers, chills, trauma, or any other systemic symptoms.

## 2025-03-21 LAB
ALBUMIN SERPL ELPH-MCNC: 4.3 G/DL — SIGNIFICANT CHANGE UP (ref 3.5–5.2)
ALP SERPL-CCNC: 46 U/L — SIGNIFICANT CHANGE UP (ref 30–115)
ALT FLD-CCNC: 27 U/L — SIGNIFICANT CHANGE UP (ref 0–41)
ANION GAP SERPL CALC-SCNC: 11 MMOL/L — SIGNIFICANT CHANGE UP (ref 7–14)
AST SERPL-CCNC: 28 U/L — SIGNIFICANT CHANGE UP (ref 0–41)
BILIRUB SERPL-MCNC: 0.9 MG/DL — SIGNIFICANT CHANGE UP (ref 0.2–1.2)
BUN SERPL-MCNC: 23 MG/DL — HIGH (ref 10–20)
CALCIUM SERPL-MCNC: 9.2 MG/DL — SIGNIFICANT CHANGE UP (ref 8.4–10.5)
CHLORIDE SERPL-SCNC: 102 MMOL/L — SIGNIFICANT CHANGE UP (ref 98–110)
CO2 SERPL-SCNC: 25 MMOL/L — SIGNIFICANT CHANGE UP (ref 17–32)
CREAT SERPL-MCNC: 1 MG/DL — SIGNIFICANT CHANGE UP (ref 0.7–1.5)
CRP SERPL-MCNC: <3 MG/L — SIGNIFICANT CHANGE UP
EGFR: 91 ML/MIN/1.73M2 — SIGNIFICANT CHANGE UP
EGFR: 91 ML/MIN/1.73M2 — SIGNIFICANT CHANGE UP
ERYTHROCYTE [SEDIMENTATION RATE] IN BLOOD: 3 MM/HR — SIGNIFICANT CHANGE UP (ref 0–10)
GLUCOSE SERPL-MCNC: 94 MG/DL — SIGNIFICANT CHANGE UP (ref 70–99)
POTASSIUM SERPL-MCNC: 4.5 MMOL/L — SIGNIFICANT CHANGE UP (ref 3.5–5)
POTASSIUM SERPL-SCNC: 4.5 MMOL/L — SIGNIFICANT CHANGE UP (ref 3.5–5)
PROT SERPL-MCNC: 6.4 G/DL — SIGNIFICANT CHANGE UP (ref 6–8)
SODIUM SERPL-SCNC: 138 MMOL/L — SIGNIFICANT CHANGE UP (ref 135–146)

## 2025-04-01 ENCOUNTER — APPOINTMENT (OUTPATIENT)
Dept: ORTHOPEDIC SURGERY | Facility: CLINIC | Age: 50
End: 2025-04-01

## 2025-04-01 DIAGNOSIS — S89.91XA UNSPECIFIED INJURY OF RIGHT LOWER LEG, INITIAL ENCOUNTER: ICD-10-CM

## 2025-04-01 PROCEDURE — 99203 OFFICE O/P NEW LOW 30 MIN: CPT | Mod: 25

## 2025-04-01 PROCEDURE — 20610 DRAIN/INJ JOINT/BURSA W/O US: CPT

## 2025-04-02 RX ORDER — NABUMETONE 500 MG/1
500 TABLET, FILM COATED ORAL
Qty: 28 | Refills: 0 | Status: ACTIVE | COMMUNITY
Start: 2025-04-02 | End: 1900-01-01

## 2025-04-02 RX ORDER — DIAZEPAM 5 MG/1
5 TABLET ORAL
Qty: 1 | Refills: 0 | Status: ACTIVE | COMMUNITY
Start: 2025-04-02 | End: 1900-01-01

## 2025-04-16 ENCOUNTER — APPOINTMENT (OUTPATIENT)
Dept: ORTHOPEDIC SURGERY | Facility: CLINIC | Age: 50
End: 2025-04-16

## 2025-05-13 ENCOUNTER — APPOINTMENT (OUTPATIENT)
Dept: ORTHOPEDIC SURGERY | Facility: CLINIC | Age: 50
End: 2025-05-13
Payer: COMMERCIAL

## 2025-05-13 DIAGNOSIS — S89.91XA UNSPECIFIED INJURY OF RIGHT LOWER LEG, INITIAL ENCOUNTER: ICD-10-CM

## 2025-05-13 PROCEDURE — 99204 OFFICE O/P NEW MOD 45 MIN: CPT

## 2025-05-13 RX ORDER — MELOXICAM 15 MG/1
15 TABLET ORAL
Qty: 30 | Refills: 1 | Status: ACTIVE | COMMUNITY
Start: 2025-05-13 | End: 1900-01-01

## 2025-05-27 NOTE — PROCEDURAL SAFETY CHECKLIST WITH OR WITHOUT SEDATION - NSRESOLVEDISCREP_GEN_ALL_CORE
[facility] PHYSICIANS [facility], [facility]  [address]  [facility] [address]  Dept: [phone]    [facility] Urology Office Note - Established    Patient:  Jigar Davalos  YOB: 1973  Date: 5/27/2025    The patient is a 51 y.o. male who presents todayfor evaluation of the following problems:   Chief Complaint   Patient presents with    Follow-up     1yr follow up previous Kidney cancer, CT a/p       HPI  He is here for kidney cancer.   Nephrectomy was 4 years ago.   No new issues.   No hematuria.   FR was 72 in November.   PSA has been normal.       Summary of old records: N/A    Additional History: N/A    Procedures Today: N/A    Urinalysis today:  No results found for this visit on 05/27/25.  Last several PSA's:  Lab Results   Component Value Date    PSA 1.70 11/07/2024    PSA 1.58 11/04/2023    PSA 0.84 01/20/2022     Last total testosterone:  No results found for: \"TESTOSTERONE\"    AUA Symptom Score (5/27/2025):                               Last BUN and creatinine:  Lab Results   Component Value Date    BUN 19 11/07/2024     Lab Results   Component Value Date    CREATININE 1.2 11/07/2024       Additional Lab/Culture results: none    Imaging Reviewed during this Office Visit: CT images reviewed and negative.     (results were independently reviewed by physician and radiology report verified)    PAST MEDICAL, FAMILY AND SOCIAL HISTORY UPDATE:  Past Medical History:   Diagnosis Date    COVID-19 04/11/2021    SOB, PNEUMONIA, LOSS OF TASTE/SMELL    Hx of hematuria     Hyperlipidemia     Hypertension     Renal mass     Wellness examination 04/08/2021    pcp-Dr Levy-last visit 5/18/2021     Past Surgical History:   Procedure Laterality Date    COLONOSCOPY  03/11/2022    COLONOSCOPY N/A 3/11/2022    COLORECTAL CANCER SCREENING, NOT HIGH RISK performed by Nura Leal DO at [facility] OR    CYSTOSCOPY  5/24/2021    CYSTOSCOPY  done

## 2025-07-25 ENCOUNTER — OUTPATIENT (OUTPATIENT)
Dept: OUTPATIENT SERVICES | Facility: HOSPITAL | Age: 50
LOS: 1 days | End: 2025-07-25
Payer: COMMERCIAL

## 2025-07-25 DIAGNOSIS — Z00.8 ENCOUNTER FOR OTHER GENERAL EXAMINATION: ICD-10-CM

## 2025-07-25 DIAGNOSIS — F17.210 NICOTINE DEPENDENCE, CIGARETTES, UNCOMPLICATED: ICD-10-CM

## 2025-07-25 DIAGNOSIS — Z98.890 OTHER SPECIFIED POSTPROCEDURAL STATES: Chronic | ICD-10-CM

## 2025-07-25 PROCEDURE — 71250 CT THORAX DX C-: CPT

## 2025-07-25 PROCEDURE — 71250 CT THORAX DX C-: CPT | Mod: 26

## 2025-07-26 DIAGNOSIS — F17.210 NICOTINE DEPENDENCE, CIGARETTES, UNCOMPLICATED: ICD-10-CM
